# Patient Record
Sex: FEMALE | Race: WHITE | Employment: OTHER | URBAN - NONMETROPOLITAN AREA
[De-identification: names, ages, dates, MRNs, and addresses within clinical notes are randomized per-mention and may not be internally consistent; named-entity substitution may affect disease eponyms.]

---

## 2018-03-02 ENCOUNTER — HOSPITAL ENCOUNTER (INPATIENT)
Age: 83
LOS: 6 days | Discharge: NURSING, REHAB OR LONG-TERM CARE | DRG: 193 | End: 2018-03-11
Attending: EMERGENCY MEDICINE | Admitting: INTERNAL MEDICINE
Payer: MEDICARE

## 2018-03-02 ENCOUNTER — APPOINTMENT (OUTPATIENT)
Dept: GENERAL RADIOLOGY | Age: 83
DRG: 193 | End: 2018-03-02
Payer: MEDICARE

## 2018-03-02 DIAGNOSIS — N39.0 URINARY TRACT INFECTION WITHOUT HEMATURIA, SITE UNSPECIFIED: ICD-10-CM

## 2018-03-02 DIAGNOSIS — R41.82 ALTERED MENTAL STATUS, UNSPECIFIED ALTERED MENTAL STATUS TYPE: Primary | ICD-10-CM

## 2018-03-02 DIAGNOSIS — M06.062 RHEUMATOID ARTHRITIS INVOLVING LEFT KNEE WITH NEGATIVE RHEUMATOID FACTOR (HCC): ICD-10-CM

## 2018-03-02 DIAGNOSIS — R77.8 ELEVATED TROPONIN: ICD-10-CM

## 2018-03-02 PROBLEM — E86.0 DEHYDRATION: Status: ACTIVE | Noted: 2018-03-02

## 2018-03-02 PROBLEM — G93.40 ACUTE ENCEPHALOPATHY: Status: ACTIVE | Noted: 2018-03-02

## 2018-03-02 LAB
ALBUMIN SERPL-MCNC: 3.3 G/DL (ref 3.5–5.1)
ALP BLD-CCNC: 43 U/L (ref 38–126)
ALT SERPL-CCNC: 21 U/L (ref 11–66)
ANION GAP SERPL CALCULATED.3IONS-SCNC: 10 MEQ/L (ref 8–16)
ANISOCYTOSIS: ABNORMAL
AST SERPL-CCNC: 22 U/L (ref 5–40)
AVERAGE GLUCOSE: 120 MG/DL (ref 70–126)
BACTERIA: ABNORMAL /HPF
BASOPHILS # BLD: 0.3 %
BASOPHILS ABSOLUTE: 0 THOU/MM3 (ref 0–0.1)
BILIRUB SERPL-MCNC: 0.5 MG/DL (ref 0.3–1.2)
BILIRUBIN URINE: NEGATIVE
BLOOD, URINE: NEGATIVE
BUN BLDV-MCNC: 21 MG/DL (ref 7–22)
CALCIUM SERPL-MCNC: 10.2 MG/DL (ref 8.5–10.5)
CASTS 2: ABNORMAL /LPF
CASTS UA: ABNORMAL /LPF
CHARACTER, URINE: ABNORMAL
CHLORIDE BLD-SCNC: 98 MEQ/L (ref 98–111)
CO2: 36 MEQ/L (ref 23–33)
COLOR: YELLOW
CREAT SERPL-MCNC: 0.7 MG/DL (ref 0.4–1.2)
CRYSTALS, UA: ABNORMAL
EKG ATRIAL RATE: 67 BPM
EKG P AXIS: 42 DEGREES
EKG P-R INTERVAL: 110 MS
EKG Q-T INTERVAL: 406 MS
EKG QRS DURATION: 90 MS
EKG QTC CALCULATION (BAZETT): 429 MS
EKG R AXIS: -27 DEGREES
EKG T AXIS: 105 DEGREES
EKG VENTRICULAR RATE: 67 BPM
EOSINOPHIL # BLD: 1.7 %
EOSINOPHILS ABSOLUTE: 0.2 THOU/MM3 (ref 0–0.4)
EPITHELIAL CELLS, UA: ABNORMAL /HPF
GFR SERPL CREATININE-BSD FRML MDRD: 80 ML/MIN/1.73M2
GLUCOSE BLD-MCNC: 112 MG/DL (ref 70–108)
GLUCOSE BLD-MCNC: 137 MG/DL (ref 70–108)
GLUCOSE URINE: NEGATIVE MG/DL
HBA1C MFR BLD: 6 % (ref 4.4–6.4)
HCT VFR BLD CALC: 34.7 % (ref 37–47)
HEMOGLOBIN: 10.8 GM/DL (ref 12–16)
INR BLD: 0.96 (ref 0.85–1.13)
KETONES, URINE: NEGATIVE
LACTIC ACID: 1.8 MMOL/L (ref 0.5–2.2)
LEUKOCYTE ESTERASE, URINE: ABNORMAL
LYMPHOCYTES # BLD: 10.2 %
LYMPHOCYTES ABSOLUTE: 0.9 THOU/MM3 (ref 1–4.8)
MCH RBC QN AUTO: 29 PG (ref 27–31)
MCHC RBC AUTO-ENTMCNC: 31 GM/DL (ref 33–37)
MCV RBC AUTO: 93.4 FL (ref 81–99)
MISCELLANEOUS 2: ABNORMAL
MISCELLANEOUS LAB TEST RESULT: ABNORMAL
MONOCYTES # BLD: 3.6 %
MONOCYTES ABSOLUTE: 0.3 THOU/MM3 (ref 0.4–1.3)
NITRITE, URINE: NEGATIVE
NUCLEATED RED BLOOD CELLS: 0 /100 WBC
OSMOLALITY CALCULATION: 290.6 MOSMOL/KG (ref 275–300)
PDW BLD-RTO: 23.1 % (ref 11.5–14.5)
PH UA: 6.5
PLATELET # BLD: 256 THOU/MM3 (ref 130–400)
PMV BLD AUTO: 8 FL (ref 7.4–10.4)
POTASSIUM REFLEX MAGNESIUM: 3.7 MEQ/L (ref 3.5–5.2)
PRO-BNP: 1766 PG/ML (ref 0–1800)
PROTEIN UA: NEGATIVE
RBC # BLD: 3.71 MILL/MM3 (ref 4.2–5.4)
RBC URINE: ABNORMAL /HPF
RENAL EPITHELIAL, UA: ABNORMAL
SEG NEUTROPHILS: 84.2 %
SEGMENTED NEUTROPHILS ABSOLUTE COUNT: 7.7 THOU/MM3 (ref 1.8–7.7)
SODIUM BLD-SCNC: 144 MEQ/L (ref 135–145)
SPECIFIC GRAVITY, URINE: 1.03 (ref 1–1.03)
TOTAL PROTEIN: 5.9 G/DL (ref 6.1–8)
TROPONIN T: 0.05 NG/ML
UROBILINOGEN, URINE: 1 EU/DL
WBC # BLD: 9.2 THOU/MM3 (ref 4.8–10.8)
WBC UA: ABNORMAL /HPF
YEAST: ABNORMAL

## 2018-03-02 PROCEDURE — 36415 COLL VENOUS BLD VENIPUNCTURE: CPT

## 2018-03-02 PROCEDURE — 99223 1ST HOSP IP/OBS HIGH 75: CPT | Performed by: INTERNAL MEDICINE

## 2018-03-02 PROCEDURE — 83880 ASSAY OF NATRIURETIC PEPTIDE: CPT

## 2018-03-02 PROCEDURE — 6360000002 HC RX W HCPCS: Performed by: INTERNAL MEDICINE

## 2018-03-02 PROCEDURE — 87106 FUNGI IDENTIFICATION YEAST: CPT

## 2018-03-02 PROCEDURE — 80053 COMPREHEN METABOLIC PANEL: CPT

## 2018-03-02 PROCEDURE — 2580000003 HC RX 258: Performed by: INTERNAL MEDICINE

## 2018-03-02 PROCEDURE — 83605 ASSAY OF LACTIC ACID: CPT

## 2018-03-02 PROCEDURE — G0378 HOSPITAL OBSERVATION PER HR: HCPCS

## 2018-03-02 PROCEDURE — 83036 HEMOGLOBIN GLYCOSYLATED A1C: CPT

## 2018-03-02 PROCEDURE — 71045 X-RAY EXAM CHEST 1 VIEW: CPT

## 2018-03-02 PROCEDURE — 6360000002 HC RX W HCPCS: Performed by: EMERGENCY MEDICINE

## 2018-03-02 PROCEDURE — 84484 ASSAY OF TROPONIN QUANT: CPT

## 2018-03-02 PROCEDURE — 82948 REAGENT STRIP/BLOOD GLUCOSE: CPT

## 2018-03-02 PROCEDURE — 99285 EMERGENCY DEPT VISIT HI MDM: CPT

## 2018-03-02 PROCEDURE — 96365 THER/PROPH/DIAG IV INF INIT: CPT

## 2018-03-02 PROCEDURE — 93010 ELECTROCARDIOGRAM REPORT: CPT | Performed by: NUCLEAR MEDICINE

## 2018-03-02 PROCEDURE — 93005 ELECTROCARDIOGRAM TRACING: CPT | Performed by: EMERGENCY MEDICINE

## 2018-03-02 PROCEDURE — 87086 URINE CULTURE/COLONY COUNT: CPT

## 2018-03-02 PROCEDURE — 81001 URINALYSIS AUTO W/SCOPE: CPT

## 2018-03-02 PROCEDURE — 87040 BLOOD CULTURE FOR BACTERIA: CPT

## 2018-03-02 PROCEDURE — 85610 PROTHROMBIN TIME: CPT

## 2018-03-02 PROCEDURE — 6370000000 HC RX 637 (ALT 250 FOR IP): Performed by: INTERNAL MEDICINE

## 2018-03-02 PROCEDURE — 85025 COMPLETE CBC W/AUTO DIFF WBC: CPT

## 2018-03-02 RX ORDER — POTASSIUM CHLORIDE 20MEQ/15ML
40 LIQUID (ML) ORAL PRN
Status: DISCONTINUED | OUTPATIENT
Start: 2018-03-02 | End: 2018-03-11 | Stop reason: HOSPADM

## 2018-03-02 RX ORDER — DEXTROSE MONOHYDRATE 25 G/50ML
12.5 INJECTION, SOLUTION INTRAVENOUS PRN
Status: DISCONTINUED | OUTPATIENT
Start: 2018-03-02 | End: 2018-03-11 | Stop reason: HOSPADM

## 2018-03-02 RX ORDER — LANOLIN ALCOHOL/MO/W.PET/CERES
50 CREAM (GRAM) TOPICAL DAILY
Status: DISCONTINUED | OUTPATIENT
Start: 2018-03-02 | End: 2018-03-11 | Stop reason: HOSPADM

## 2018-03-02 RX ORDER — ONDANSETRON 2 MG/ML
4 INJECTION INTRAMUSCULAR; INTRAVENOUS EVERY 6 HOURS PRN
Status: DISCONTINUED | OUTPATIENT
Start: 2018-03-02 | End: 2018-03-11 | Stop reason: HOSPADM

## 2018-03-02 RX ORDER — GABAPENTIN 100 MG/1
100 CAPSULE ORAL 2 TIMES DAILY
Status: DISCONTINUED | OUTPATIENT
Start: 2018-03-02 | End: 2018-03-11 | Stop reason: HOSPADM

## 2018-03-02 RX ORDER — DEXTROSE MONOHYDRATE 50 MG/ML
100 INJECTION, SOLUTION INTRAVENOUS PRN
Status: DISCONTINUED | OUTPATIENT
Start: 2018-03-02 | End: 2018-03-11 | Stop reason: HOSPADM

## 2018-03-02 RX ORDER — CLOPIDOGREL BISULFATE 75 MG/1
75 TABLET ORAL DAILY
Status: DISCONTINUED | OUTPATIENT
Start: 2018-03-03 | End: 2018-03-11 | Stop reason: HOSPADM

## 2018-03-02 RX ORDER — ACETAMINOPHEN 325 MG/1
650 TABLET ORAL EVERY 4 HOURS PRN
Status: DISCONTINUED | OUTPATIENT
Start: 2018-03-02 | End: 2018-03-11 | Stop reason: HOSPADM

## 2018-03-02 RX ORDER — ISOSORBIDE MONONITRATE 30 MG/1
30 TABLET, EXTENDED RELEASE ORAL DAILY
Status: DISCONTINUED | OUTPATIENT
Start: 2018-03-03 | End: 2018-03-11 | Stop reason: HOSPADM

## 2018-03-02 RX ORDER — ACETAMINOPHEN 325 MG/1
650 TABLET ORAL EVERY 4 HOURS PRN
Status: DISCONTINUED | OUTPATIENT
Start: 2018-03-02 | End: 2018-03-02 | Stop reason: SDUPTHER

## 2018-03-02 RX ORDER — GLIPIZIDE 5 MG/1
5 TABLET ORAL
Status: DISCONTINUED | OUTPATIENT
Start: 2018-03-03 | End: 2018-03-06

## 2018-03-02 RX ORDER — SODIUM CHLORIDE 0.9 % (FLUSH) 0.9 %
10 SYRINGE (ML) INJECTION EVERY 12 HOURS SCHEDULED
Status: DISCONTINUED | OUTPATIENT
Start: 2018-03-02 | End: 2018-03-11 | Stop reason: HOSPADM

## 2018-03-02 RX ORDER — ASPIRIN 81 MG/1
81 TABLET ORAL DAILY
Status: DISCONTINUED | OUTPATIENT
Start: 2018-03-03 | End: 2018-03-11 | Stop reason: HOSPADM

## 2018-03-02 RX ORDER — NICOTINE POLACRILEX 4 MG
15 LOZENGE BUCCAL PRN
Status: DISCONTINUED | OUTPATIENT
Start: 2018-03-02 | End: 2018-03-11 | Stop reason: HOSPADM

## 2018-03-02 RX ORDER — ATENOLOL 50 MG/1
50 TABLET ORAL DAILY
Status: DISCONTINUED | OUTPATIENT
Start: 2018-03-03 | End: 2018-03-11 | Stop reason: HOSPADM

## 2018-03-02 RX ORDER — POTASSIUM CHLORIDE 7.45 MG/ML
10 INJECTION INTRAVENOUS PRN
Status: DISCONTINUED | OUTPATIENT
Start: 2018-03-02 | End: 2018-03-11 | Stop reason: HOSPADM

## 2018-03-02 RX ORDER — LEVOFLOXACIN 5 MG/ML
500 INJECTION, SOLUTION INTRAVENOUS ONCE
Status: COMPLETED | OUTPATIENT
Start: 2018-03-02 | End: 2018-03-02

## 2018-03-02 RX ORDER — POTASSIUM CHLORIDE 750 MG/1
20 TABLET, FILM COATED, EXTENDED RELEASE ORAL 2 TIMES DAILY WITH MEALS
Status: DISCONTINUED | OUTPATIENT
Start: 2018-03-02 | End: 2018-03-07

## 2018-03-02 RX ORDER — BUSPIRONE HYDROCHLORIDE 10 MG/1
10 TABLET ORAL 2 TIMES DAILY
Status: DISCONTINUED | OUTPATIENT
Start: 2018-03-02 | End: 2018-03-07

## 2018-03-02 RX ORDER — TROSPIUM CHLORIDE 20 MG/1
20 TABLET, FILM COATED ORAL 2 TIMES DAILY
Status: DISCONTINUED | OUTPATIENT
Start: 2018-03-02 | End: 2018-03-06

## 2018-03-02 RX ORDER — AMLODIPINE BESYLATE 5 MG/1
5 TABLET ORAL DAILY
Status: DISCONTINUED | OUTPATIENT
Start: 2018-03-02 | End: 2018-03-11 | Stop reason: HOSPADM

## 2018-03-02 RX ORDER — POTASSIUM CHLORIDE 20 MEQ/1
40 TABLET, EXTENDED RELEASE ORAL PRN
Status: DISCONTINUED | OUTPATIENT
Start: 2018-03-02 | End: 2018-03-11 | Stop reason: HOSPADM

## 2018-03-02 RX ORDER — CLONIDINE HYDROCHLORIDE 0.1 MG/1
0.1 TABLET ORAL 2 TIMES DAILY
Status: DISCONTINUED | OUTPATIENT
Start: 2018-03-02 | End: 2018-03-06

## 2018-03-02 RX ORDER — IRON POLYSACCHARIDE COMPLEX 150 MG
150 CAPSULE ORAL DAILY
Status: DISCONTINUED | OUTPATIENT
Start: 2018-03-02 | End: 2018-03-11 | Stop reason: HOSPADM

## 2018-03-02 RX ORDER — SODIUM CHLORIDE 0.9 % (FLUSH) 0.9 %
10 SYRINGE (ML) INJECTION PRN
Status: DISCONTINUED | OUTPATIENT
Start: 2018-03-02 | End: 2018-03-02 | Stop reason: SDUPTHER

## 2018-03-02 RX ORDER — SODIUM CHLORIDE 0.9 % (FLUSH) 0.9 %
10 SYRINGE (ML) INJECTION PRN
Status: DISCONTINUED | OUTPATIENT
Start: 2018-03-02 | End: 2018-03-11 | Stop reason: HOSPADM

## 2018-03-02 RX ORDER — SODIUM CHLORIDE 9 MG/ML
INJECTION, SOLUTION INTRAVENOUS ONCE
Status: COMPLETED | OUTPATIENT
Start: 2018-03-02 | End: 2018-03-03

## 2018-03-02 RX ORDER — SODIUM CHLORIDE 0.9 % (FLUSH) 0.9 %
10 SYRINGE (ML) INJECTION EVERY 12 HOURS SCHEDULED
Status: DISCONTINUED | OUTPATIENT
Start: 2018-03-02 | End: 2018-03-02 | Stop reason: SDUPTHER

## 2018-03-02 RX ORDER — SIMVASTATIN 20 MG
20 TABLET ORAL NIGHTLY
Status: DISCONTINUED | OUTPATIENT
Start: 2018-03-02 | End: 2018-03-06

## 2018-03-02 RX ADMIN — GABAPENTIN 100 MG: 100 CAPSULE ORAL at 22:06

## 2018-03-02 RX ADMIN — PYRIDOXINE HCL TAB 50 MG 50 MG: 50 TAB at 22:06

## 2018-03-02 RX ADMIN — CLONIDINE HYDROCHLORIDE 0.1 MG: 0.1 TABLET ORAL at 22:06

## 2018-03-02 RX ADMIN — MAGNESIUM 64 MG (MAGNESIUM CHLORIDE) TABLET,DELAYED RELEASE 535 MG: at 23:39

## 2018-03-02 RX ADMIN — TROSPIUM CHLORIDE 20 MG: 20 TABLET ORAL at 22:06

## 2018-03-02 RX ADMIN — SIMVASTATIN 20 MG: 20 TABLET, FILM COATED ORAL at 22:06

## 2018-03-02 RX ADMIN — Medication 150 MG: at 23:39

## 2018-03-02 RX ADMIN — SODIUM CHLORIDE: 9 INJECTION, SOLUTION INTRAVENOUS at 22:05

## 2018-03-02 RX ADMIN — BUSPIRONE HYDROCHLORIDE 10 MG: 10 TABLET ORAL at 22:06

## 2018-03-02 RX ADMIN — LEVOFLOXACIN 500 MG: 5 INJECTION, SOLUTION INTRAVENOUS at 17:49

## 2018-03-02 RX ADMIN — AMLODIPINE BESYLATE 5 MG: 5 TABLET ORAL at 22:06

## 2018-03-02 RX ADMIN — Medication 10 ML: at 22:15

## 2018-03-02 RX ADMIN — POTASSIUM CHLORIDE 20 MEQ: 750 TABLET, FILM COATED, EXTENDED RELEASE ORAL at 22:05

## 2018-03-02 ASSESSMENT — PAIN DESCRIPTION - LOCATION
LOCATION: LEG

## 2018-03-02 ASSESSMENT — PAIN DESCRIPTION - DESCRIPTORS
DESCRIPTORS: ACHING

## 2018-03-02 ASSESSMENT — PAIN DESCRIPTION - FREQUENCY
FREQUENCY: CONTINUOUS

## 2018-03-02 ASSESSMENT — PAIN SCALES - GENERAL
PAINLEVEL_OUTOF10: 4
PAINLEVEL_OUTOF10: 0
PAINLEVEL_OUTOF10: 4
PAINLEVEL_OUTOF10: 4

## 2018-03-02 ASSESSMENT — PAIN DESCRIPTION - ONSET
ONSET: ON-GOING

## 2018-03-02 ASSESSMENT — PAIN DESCRIPTION - PAIN TYPE
TYPE: CHRONIC PAIN

## 2018-03-02 ASSESSMENT — PAIN DESCRIPTION - PROGRESSION
CLINICAL_PROGRESSION: NOT CHANGED

## 2018-03-02 NOTE — ED PROVIDER NOTES
PO) Take by mouth      Vitamin D (CHOLECALCIFEROL) 1000 UNITS CAPS capsule Take 1,000 Units by mouth daily      Multiple Vitamins-Minerals (PX SENIOR VITAMIN PO) Take by mouth      acetaminophen (TYLENOL) 500 MG tablet Take 500 mg by mouth daily      atenolol (TENORMIN) 50 MG tablet Take 50 mg by mouth daily      clopidogrel (PLAVIX) 75 MG tablet Take 75 mg by mouth daily      Polysaccharide Iron Complex (POLY-IRON 150 PO) Take by mouth daily      isosorbide mononitrate (IMDUR) 30 MG CR tablet Take 30 mg by mouth daily      busPIRone (BUSPAR) 10 MG tablet Take 10 mg by mouth 2 times daily      sitaGLIPtin (JANUVIA) 50 MG tablet Take 50 mg by mouth daily      simvastatin (ZOCOR) 20 MG tablet Take 20 mg by mouth nightly      ezetimibe (ZETIA) 10 MG tablet Take 10 mg by mouth daily      GABAPENTIN PO Take by mouth 3 times daily      metformin (GLUCOPHAGE) 500 MG tablet Take 1,000 mg by mouth 3 times daily. Stop 2 days before sx      furosemide (LASIX) 40 MG tablet   Take 20 mg by mouth daily       aspirin 81 MG tablet Take 81 mg by mouth daily. Stop 5 days before sx      methotrexate (RHEUMATREX) 2.5 MG chemo tablet Take 2.5 mg by mouth once a week. On fridays      predniSONE (DELTASONE) 10 MG tablet Take 10 mg by mouth three times a week. M-W-F      Pyridoxine HCl (VITAMIN B6 PO) Take by mouth      glipiZIDE (GLUCOTROL) 5 MG tablet Take 5 mg by mouth 2 times daily (before meals)      Magnesium Cl-Calcium Carbonate (SLOW-MAG PO) Take by mouth      amLODIPine (NORVASC) 5 MG tablet Take 5 mg by mouth daily      cloNIDine (CATAPRES) 0.1 MG tablet Take 0.1 mg by mouth 2 times daily      potassium chloride (KLOR-CON) 20 MEQ packet Take 20 mEq by mouth 2 times daily             ALLERGIES     is allergic to detrol [tolterodine tartrate]; doxycycline; fosamax [alendronate]; and lotensin [benazepril]. FAMILY HISTORY     has no family status information on file. family history is not on file.     SOCIAL HISTORY      reports

## 2018-03-03 LAB
ANION GAP SERPL CALCULATED.3IONS-SCNC: 8 MEQ/L (ref 8–16)
BUN BLDV-MCNC: 19 MG/DL (ref 7–22)
CALCIUM SERPL-MCNC: 8.8 MG/DL (ref 8.5–10.5)
CHLORIDE BLD-SCNC: 105 MEQ/L (ref 98–111)
CO2: 33 MEQ/L (ref 23–33)
CREAT SERPL-MCNC: 0.9 MG/DL (ref 0.4–1.2)
GFR SERPL CREATININE-BSD FRML MDRD: 60 ML/MIN/1.73M2
GLUCOSE BLD-MCNC: 65 MG/DL (ref 70–108)
GLUCOSE BLD-MCNC: 67 MG/DL (ref 70–108)
GLUCOSE BLD-MCNC: 83 MG/DL (ref 70–108)
GLUCOSE BLD-MCNC: 85 MG/DL (ref 70–108)
GLUCOSE BLD-MCNC: 86 MG/DL (ref 70–108)
GLUCOSE BLD-MCNC: 99 MG/DL (ref 70–108)
HCT VFR BLD CALC: 30.9 % (ref 37–47)
HEMOGLOBIN: 9.8 GM/DL (ref 12–16)
MCH RBC QN AUTO: 29.8 PG (ref 27–31)
MCHC RBC AUTO-ENTMCNC: 31.8 GM/DL (ref 33–37)
MCV RBC AUTO: 93.6 FL (ref 81–99)
OSMOLALITY CALCULATION: 291 MOSMOL/KG (ref 275–300)
PDW BLD-RTO: 21.3 % (ref 11.5–14.5)
PLATELET # BLD: 218 THOU/MM3 (ref 130–400)
PMV BLD AUTO: 7.8 FL (ref 7.4–10.4)
POTASSIUM REFLEX MAGNESIUM: 3.9 MEQ/L (ref 3.5–5.2)
RBC # BLD: 3.31 MILL/MM3 (ref 4.2–5.4)
SODIUM BLD-SCNC: 146 MEQ/L (ref 135–145)
TROPONIN T: 0.03 NG/ML
TROPONIN T: 0.03 NG/ML
WBC # BLD: 6.8 THOU/MM3 (ref 4.8–10.8)

## 2018-03-03 PROCEDURE — G0378 HOSPITAL OBSERVATION PER HR: HCPCS

## 2018-03-03 PROCEDURE — 36415 COLL VENOUS BLD VENIPUNCTURE: CPT

## 2018-03-03 PROCEDURE — 6370000000 HC RX 637 (ALT 250 FOR IP): Performed by: INTERNAL MEDICINE

## 2018-03-03 PROCEDURE — 80048 BASIC METABOLIC PNL TOTAL CA: CPT

## 2018-03-03 PROCEDURE — 87086 URINE CULTURE/COLONY COUNT: CPT

## 2018-03-03 PROCEDURE — 99232 SBSQ HOSP IP/OBS MODERATE 35: CPT | Performed by: INTERNAL MEDICINE

## 2018-03-03 PROCEDURE — 85027 COMPLETE CBC AUTOMATED: CPT

## 2018-03-03 PROCEDURE — 96372 THER/PROPH/DIAG INJ SC/IM: CPT

## 2018-03-03 PROCEDURE — 51702 INSERT TEMP BLADDER CATH: CPT

## 2018-03-03 PROCEDURE — 6360000002 HC RX W HCPCS: Performed by: INTERNAL MEDICINE

## 2018-03-03 PROCEDURE — 82948 REAGENT STRIP/BLOOD GLUCOSE: CPT

## 2018-03-03 PROCEDURE — 2580000003 HC RX 258: Performed by: INTERNAL MEDICINE

## 2018-03-03 PROCEDURE — 84484 ASSAY OF TROPONIN QUANT: CPT

## 2018-03-03 PROCEDURE — 96367 TX/PROPH/DG ADDL SEQ IV INF: CPT

## 2018-03-03 RX ORDER — SODIUM CHLORIDE 9 MG/ML
INJECTION, SOLUTION INTRAVENOUS ONCE
Status: DISCONTINUED | OUTPATIENT
Start: 2018-03-03 | End: 2018-03-07

## 2018-03-03 RX ADMIN — GLIPIZIDE 5 MG: 5 TABLET ORAL at 03:33

## 2018-03-03 RX ADMIN — TROSPIUM CHLORIDE 20 MG: 20 TABLET ORAL at 09:29

## 2018-03-03 RX ADMIN — Medication 10 ML: at 19:48

## 2018-03-03 RX ADMIN — SIMVASTATIN 20 MG: 20 TABLET, FILM COATED ORAL at 20:07

## 2018-03-03 RX ADMIN — Medication 10 ML: at 09:39

## 2018-03-03 RX ADMIN — CLONIDINE HYDROCHLORIDE 0.1 MG: 0.1 TABLET ORAL at 20:08

## 2018-03-03 RX ADMIN — TROSPIUM CHLORIDE 20 MG: 20 TABLET ORAL at 20:09

## 2018-03-03 RX ADMIN — ASPIRIN 81 MG: 81 TABLET ORAL at 09:29

## 2018-03-03 RX ADMIN — Medication 150 MG: at 09:31

## 2018-03-03 RX ADMIN — CLONIDINE HYDROCHLORIDE 0.1 MG: 0.1 TABLET ORAL at 09:30

## 2018-03-03 RX ADMIN — VITAMIN D, TAB 1000IU (100/BT) 1000 UNITS: 25 TAB at 09:30

## 2018-03-03 RX ADMIN — CLOPIDOGREL BISULFATE 75 MG: 75 TABLET, FILM COATED ORAL at 09:30

## 2018-03-03 RX ADMIN — POTASSIUM CHLORIDE 20 MEQ: 750 TABLET, FILM COATED, EXTENDED RELEASE ORAL at 09:29

## 2018-03-03 RX ADMIN — LINAGLIPTIN 5 MG: 5 TABLET, FILM COATED ORAL at 09:30

## 2018-03-03 RX ADMIN — GABAPENTIN 100 MG: 100 CAPSULE ORAL at 20:08

## 2018-03-03 RX ADMIN — ATENOLOL 50 MG: 50 TABLET ORAL at 09:30

## 2018-03-03 RX ADMIN — MAGNESIUM 64 MG (MAGNESIUM CHLORIDE) TABLET,DELAYED RELEASE 535 MG: at 09:33

## 2018-03-03 RX ADMIN — AMLODIPINE BESYLATE 5 MG: 5 TABLET ORAL at 09:30

## 2018-03-03 RX ADMIN — ENOXAPARIN SODIUM 40 MG: 40 INJECTION SUBCUTANEOUS at 09:34

## 2018-03-03 RX ADMIN — GABAPENTIN 100 MG: 100 CAPSULE ORAL at 09:30

## 2018-03-03 RX ADMIN — BUSPIRONE HYDROCHLORIDE 10 MG: 10 TABLET ORAL at 09:29

## 2018-03-03 RX ADMIN — ISOSORBIDE MONONITRATE 30 MG: 30 TABLET ORAL at 09:30

## 2018-03-03 RX ADMIN — BUSPIRONE HYDROCHLORIDE 10 MG: 10 TABLET ORAL at 20:08

## 2018-03-03 ASSESSMENT — PAIN SCALES - GENERAL: PAINLEVEL_OUTOF10: 0

## 2018-03-03 NOTE — H&P
History & Physical        Patient:  Dom Kan  YOB: 1935    MRN: [id number]     Acct: [de-identified]    PCP: Andrews Lau MD    Date of Admission: 3/2/2018    Date of Service: Pt seen/examined on 3/2/18and Admitted to Inpatient with expected LOS greater than two midnights due to medical therapy. Chief Complaint:  Confusion    History Of Present Illness:      [de-identified] y.o. female who presented to [address] [place] [address] with  Confusion. Patient presents at ED with complaint of mental status. Per [facility] patient was treated at [facility] 2 times for pneumonia and UTI. Patient was discharged back to [facility] today. Per [facility] patient was confused still and they were concerned with Urosepsis. Per Er,upon arrival patient is alert and oriented x4. States only complaint is leg pain that is chronic. No n/v/d or fever or sob,orthopnea or PND was reported. Past Medical History:          Diagnosis Date    Anemia     Arthritis     CAD (coronary artery disease)     Cataract     Cellulitis     [facility]erebral artery occlusion with [facility] infarction [facility] [address]     Diabetes mellitus ([facility] [address]     Hyperlipidemia     Hypertension     MI (mitral incompetence)     Staph infection     Vitamin D deficiency        Past Surgical History:          Procedure Laterality Date    [facility]    FOOT SURGERY  1/2003    x2    JOINT REPLACEMENT      left shoulder    OTHER SURGICAL HISTORY  3/13/2013    LEFT REVERSE SHOULDER ARTHROPLASTY       Medications Prior to Admission:      Prior to Admission medications    Medication Sig Start Date End Date Taking?  Authorizing Provider   trospium (SANCTURA) 20 MG tablet Take 1 tablet by mouth 2 times daily 4/6/16  Yes Sarah Garcia MD   Calcium Citrate-Vitamin D (CALCIUM CITRATE + D3 PO) Take by mouth   Yes Historical Provider, MD   Vitamin D (CHOLECALCIFEROL) 1000 UNITS CAPS capsule Take 1,000 Units by mouth daily   Yes Historical Provider, MD

## 2018-03-03 NOTE — ED NOTES
Patient resting in bed. Son at bedside. Awaiting transport upstairs. No needs voiced at this time. Updated on plan of care. Respirations easy. Alert and oriented. Vitals as noted. Side rails up x2. Call light in reach.       Justin Maloney RN  03/02/18 [id number]

## 2018-03-03 NOTE — FLOWSHEET NOTE
03/03/18 0038   Provider Notification   Reason for Communication Evaluate  (unable to get IV access)   Provider Name Alice Chris   Provider Notification Nurse Practitioner   Method of Communication Secure Message   Response Waiting for response   Notification Time 0038  (responded at [phone])   Updated that unable to obtain IV access after multiple attempts. 0.9 @100 and Rocephin ordered. Fine crackles to bases. UA negative for bacteria. Order to dc both for now.

## 2018-03-03 NOTE — PLAN OF CARE
Problem: Falls - Risk of  Intervention: Fall risk assessment  Patient very unsteady on feet. Being assisted to bathroom using jeff stedy. Intervention: Fall precautions  Hourly rounding and bed alarm in place. Call light reinforced. Goal: Absence of falls  Outcome: Ongoing  No falls this shift, bed alarm on, and falling star precautions in place. Problem: Risk for Impaired Skin Integrity  Goal: Tissue integrity - skin and mucous membranes  Structural intactness and normal physiological function of skin and  mucous membranes. Outcome: Ongoing  Buttocks and BLE's red/purple discoloration, blanchable. Heels red, soft, blanchable. Scattered rash. Scattered bruising. Skin tear to right elbow. Being turned q2h and prn. Intervention: SKIN ASSESSMENT  Buttocks and BLE's red/purple discoloration, blanchable. Heels red, soft, blanchable. Scattered rash. Scattered bruising. Skin tear to right elbow. Intervention: TURN PATIENT  Patient being turned q2h and prn. Problem: Urinary Elimination:  Goal: Signs and symptoms of infection will decrease  Signs and symptoms of infection will decrease  Outcome: Ongoing  WBC within limits. Afebrile. UA negative for bacteria, moderate leukocytes. Rocephin dc'd, unable to obtain IV access. Problem: Pain:  Goal: Pain level will decrease  Pain level will decrease  Outcome: Met This Shift  Denies presence of pain throughout night. Will continue to monitor. Goal: Control of acute pain  Control of acute pain  Outcome: Met This Shift  Denies presence of pain throughout night. Will continue to monitor. Problem: Falls - Risk of:  Goal: Will remain free from falls  Will remain free from falls  Outcome: Ongoing      Problem: Discharge Planning:  Goal: Discharged to appropriate level of care  Discharged to appropriate level of care  Outcome: Ongoing  Patient hoping for [facility] to [facility] upon discharge.     Problem: Mental Status - Impaired:  Goal: Mental status will improve  Mental status will improve  Outcome: Ongoing  Oriented to person, place. Unsure of baseline. No family present at bedside. Comments: Care plan reviewed with patient. Patient verbalizes understanding of the plan of care and contribute to goal setting.

## 2018-03-04 LAB
GLUCOSE BLD-MCNC: 72 MG/DL (ref 70–108)
GLUCOSE BLD-MCNC: 81 MG/DL (ref 70–108)
GLUCOSE BLD-MCNC: 82 MG/DL (ref 70–108)
GLUCOSE BLD-MCNC: 93 MG/DL (ref 70–108)
TROPONIN T: 0.02 NG/ML

## 2018-03-04 PROCEDURE — 36415 COLL VENOUS BLD VENIPUNCTURE: CPT

## 2018-03-04 PROCEDURE — 82948 REAGENT STRIP/BLOOD GLUCOSE: CPT

## 2018-03-04 PROCEDURE — 84484 ASSAY OF TROPONIN QUANT: CPT

## 2018-03-04 PROCEDURE — 6360000002 HC RX W HCPCS: Performed by: INTERNAL MEDICINE

## 2018-03-04 PROCEDURE — 99232 SBSQ HOSP IP/OBS MODERATE 35: CPT | Performed by: INTERNAL MEDICINE

## 2018-03-04 PROCEDURE — 2580000003 HC RX 258: Performed by: INTERNAL MEDICINE

## 2018-03-04 PROCEDURE — 6370000000 HC RX 637 (ALT 250 FOR IP): Performed by: INTERNAL MEDICINE

## 2018-03-04 PROCEDURE — 96376 TX/PRO/DX INJ SAME DRUG ADON: CPT

## 2018-03-04 PROCEDURE — G0378 HOSPITAL OBSERVATION PER HR: HCPCS

## 2018-03-04 PROCEDURE — 96372 THER/PROPH/DIAG INJ SC/IM: CPT

## 2018-03-04 RX ORDER — FLUCONAZOLE, SODIUM CHLORIDE 2 MG/ML
100 INJECTION INTRAVENOUS EVERY 24 HOURS
Status: DISCONTINUED | OUTPATIENT
Start: 2018-03-04 | End: 2018-03-04

## 2018-03-04 RX ORDER — FLUCONAZOLE 200 MG/1
200 TABLET ORAL DAILY
Status: DISCONTINUED | OUTPATIENT
Start: 2018-03-04 | End: 2018-03-06

## 2018-03-04 RX ADMIN — GLIPIZIDE 5 MG: 5 TABLET ORAL at 05:57

## 2018-03-04 RX ADMIN — Medication 10 ML: at 08:12

## 2018-03-04 RX ADMIN — GABAPENTIN 100 MG: 100 CAPSULE ORAL at 08:07

## 2018-03-04 RX ADMIN — GABAPENTIN 100 MG: 100 CAPSULE ORAL at 20:45

## 2018-03-04 RX ADMIN — CEFTRIAXONE 1 G: 1 INJECTION, POWDER, FOR SOLUTION INTRAMUSCULAR; INTRAVENOUS at 08:13

## 2018-03-04 RX ADMIN — ASPIRIN 81 MG: 81 TABLET ORAL at 07:51

## 2018-03-04 RX ADMIN — BUSPIRONE HYDROCHLORIDE 10 MG: 10 TABLET ORAL at 20:46

## 2018-03-04 RX ADMIN — Medication 10 ML: at 20:48

## 2018-03-04 RX ADMIN — Medication 150 MG: at 07:58

## 2018-03-04 RX ADMIN — SIMVASTATIN 20 MG: 20 TABLET, FILM COATED ORAL at 20:48

## 2018-03-04 RX ADMIN — AMLODIPINE BESYLATE 5 MG: 5 TABLET ORAL at 08:09

## 2018-03-04 RX ADMIN — ENOXAPARIN SODIUM 40 MG: 40 INJECTION SUBCUTANEOUS at 08:13

## 2018-03-04 RX ADMIN — FLUCONAZOLE 200 MG: 200 TABLET ORAL at 13:07

## 2018-03-04 RX ADMIN — POTASSIUM CHLORIDE 20 MEQ: 750 TABLET, FILM COATED, EXTENDED RELEASE ORAL at 08:09

## 2018-03-04 RX ADMIN — CLOPIDOGREL BISULFATE 75 MG: 75 TABLET, FILM COATED ORAL at 07:55

## 2018-03-04 RX ADMIN — GLIPIZIDE 5 MG: 5 TABLET ORAL at 16:46

## 2018-03-04 RX ADMIN — POTASSIUM CHLORIDE 20 MEQ: 750 TABLET, FILM COATED, EXTENDED RELEASE ORAL at 16:46

## 2018-03-04 RX ADMIN — MAGNESIUM 64 MG (MAGNESIUM CHLORIDE) TABLET,DELAYED RELEASE 535 MG: at 07:58

## 2018-03-04 RX ADMIN — VITAMIN D, TAB 1000IU (100/BT) 1000 UNITS: 25 TAB at 07:52

## 2018-03-04 RX ADMIN — CLONIDINE HYDROCHLORIDE 0.1 MG: 0.1 TABLET ORAL at 20:47

## 2018-03-04 RX ADMIN — BUSPIRONE HYDROCHLORIDE 10 MG: 10 TABLET ORAL at 07:52

## 2018-03-04 RX ADMIN — PYRIDOXINE HCL TAB 50 MG 50 MG: 50 TAB at 07:58

## 2018-03-04 RX ADMIN — LINAGLIPTIN 5 MG: 5 TABLET, FILM COATED ORAL at 07:57

## 2018-03-04 RX ADMIN — ISOSORBIDE MONONITRATE 30 MG: 30 TABLET ORAL at 07:53

## 2018-03-04 RX ADMIN — CLONIDINE HYDROCHLORIDE 0.1 MG: 0.1 TABLET ORAL at 08:09

## 2018-03-04 RX ADMIN — TROSPIUM CHLORIDE 20 MG: 20 TABLET ORAL at 20:51

## 2018-03-04 RX ADMIN — ATENOLOL 50 MG: 50 TABLET ORAL at 07:58

## 2018-03-04 RX ADMIN — ACETAMINOPHEN 650 MG: 325 TABLET ORAL at 06:01

## 2018-03-04 RX ADMIN — TROSPIUM CHLORIDE 20 MG: 20 TABLET ORAL at 08:06

## 2018-03-04 ASSESSMENT — PAIN DESCRIPTION - LOCATION: LOCATION: HEAD

## 2018-03-04 ASSESSMENT — PAIN SCALES - GENERAL
PAINLEVEL_OUTOF10: 0
PAINLEVEL_OUTOF10: 4

## 2018-03-04 ASSESSMENT — PAIN DESCRIPTION - PAIN TYPE: TYPE: ACUTE PAIN

## 2018-03-04 NOTE — PLAN OF CARE
to have confusion and inappropriate answers such as the year and location are not consistently correct when asked. Patient seems to be more oriented today than yesterday but is not able to actively recall current year. No therapy ordered at this time. Comments: Care plan reviewed with patient and family. Patient and family verbalize understanding of the plan of care and contribute to goal setting.

## 2018-03-05 PROBLEM — G93.40 ENCEPHALOPATHY: Status: ACTIVE | Noted: 2018-03-05

## 2018-03-05 LAB
GLUCOSE BLD-MCNC: 115 MG/DL (ref 70–108)
GLUCOSE BLD-MCNC: 138 MG/DL (ref 70–108)
GLUCOSE BLD-MCNC: 78 MG/DL (ref 70–108)
GLUCOSE BLD-MCNC: 83 MG/DL (ref 70–108)
ORGANISM: ABNORMAL
URINE CULTURE REFLEX: ABNORMAL
URINE CULTURE, ROUTINE: NORMAL

## 2018-03-05 PROCEDURE — 1200000003 HC TELEMETRY R&B

## 2018-03-05 PROCEDURE — 6370000000 HC RX 637 (ALT 250 FOR IP): Performed by: INTERNAL MEDICINE

## 2018-03-05 PROCEDURE — 2580000003 HC RX 258: Performed by: INTERNAL MEDICINE

## 2018-03-05 PROCEDURE — 99233 SBSQ HOSP IP/OBS HIGH 50: CPT | Performed by: INTERNAL MEDICINE

## 2018-03-05 PROCEDURE — 6360000002 HC RX W HCPCS: Performed by: INTERNAL MEDICINE

## 2018-03-05 PROCEDURE — 96372 THER/PROPH/DIAG INJ SC/IM: CPT

## 2018-03-05 PROCEDURE — 82948 REAGENT STRIP/BLOOD GLUCOSE: CPT

## 2018-03-05 RX ADMIN — Medication 150 MG: at 08:44

## 2018-03-05 RX ADMIN — CLOPIDOGREL BISULFATE 75 MG: 75 TABLET, FILM COATED ORAL at 08:34

## 2018-03-05 RX ADMIN — FLUCONAZOLE 200 MG: 200 TABLET ORAL at 08:42

## 2018-03-05 RX ADMIN — CLONIDINE HYDROCHLORIDE 0.1 MG: 0.1 TABLET ORAL at 23:40

## 2018-03-05 RX ADMIN — GABAPENTIN 100 MG: 100 CAPSULE ORAL at 23:40

## 2018-03-05 RX ADMIN — BUSPIRONE HYDROCHLORIDE 10 MG: 10 TABLET ORAL at 23:41

## 2018-03-05 RX ADMIN — POTASSIUM CHLORIDE 20 MEQ: 750 TABLET, FILM COATED, EXTENDED RELEASE ORAL at 08:42

## 2018-03-05 RX ADMIN — ASPIRIN 81 MG: 81 TABLET ORAL at 08:43

## 2018-03-05 RX ADMIN — AMLODIPINE BESYLATE 5 MG: 5 TABLET ORAL at 08:42

## 2018-03-05 RX ADMIN — Medication 10 ML: at 08:43

## 2018-03-05 RX ADMIN — ISOSORBIDE MONONITRATE 30 MG: 30 TABLET ORAL at 08:39

## 2018-03-05 RX ADMIN — GLIPIZIDE 5 MG: 5 TABLET ORAL at 06:15

## 2018-03-05 RX ADMIN — SIMVASTATIN 20 MG: 20 TABLET, FILM COATED ORAL at 23:40

## 2018-03-05 RX ADMIN — GABAPENTIN 100 MG: 100 CAPSULE ORAL at 08:36

## 2018-03-05 RX ADMIN — LINAGLIPTIN 5 MG: 5 TABLET, FILM COATED ORAL at 08:42

## 2018-03-05 RX ADMIN — ENOXAPARIN SODIUM 40 MG: 40 INJECTION SUBCUTANEOUS at 08:45

## 2018-03-05 RX ADMIN — POTASSIUM CHLORIDE 20 MEQ: 750 TABLET, FILM COATED, EXTENDED RELEASE ORAL at 16:37

## 2018-03-05 RX ADMIN — MAGNESIUM 64 MG (MAGNESIUM CHLORIDE) TABLET,DELAYED RELEASE 535 MG: at 08:35

## 2018-03-05 RX ADMIN — TROSPIUM CHLORIDE 20 MG: 20 TABLET ORAL at 23:40

## 2018-03-05 RX ADMIN — BUSPIRONE HYDROCHLORIDE 10 MG: 10 TABLET ORAL at 08:33

## 2018-03-05 RX ADMIN — ACETAMINOPHEN 650 MG: 325 TABLET ORAL at 09:48

## 2018-03-05 RX ADMIN — CLONIDINE HYDROCHLORIDE 0.1 MG: 0.1 TABLET ORAL at 08:42

## 2018-03-05 RX ADMIN — ATENOLOL 50 MG: 50 TABLET ORAL at 08:32

## 2018-03-05 RX ADMIN — TROSPIUM CHLORIDE 20 MG: 20 TABLET ORAL at 08:37

## 2018-03-05 RX ADMIN — GLIPIZIDE 5 MG: 5 TABLET ORAL at 16:37

## 2018-03-05 RX ADMIN — PYRIDOXINE HCL TAB 50 MG 50 MG: 50 TAB at 08:35

## 2018-03-05 RX ADMIN — Medication 10 ML: at 23:41

## 2018-03-05 RX ADMIN — VITAMIN D, TAB 1000IU (100/BT) 1000 UNITS: 25 TAB at 08:37

## 2018-03-05 ASSESSMENT — PAIN SCALES - GENERAL
PAINLEVEL_OUTOF10: 0
PAINLEVEL_OUTOF10: 3
PAINLEVEL_OUTOF10: 5
PAINLEVEL_OUTOF10: 0

## 2018-03-05 NOTE — PLAN OF CARE
verbalize understanding of the plan of care and does not currently contribute to goal setting, no family present.

## 2018-03-05 NOTE — PLAN OF CARE
Problem: DISCHARGE BARRIERS  Goal: Patient's continuum of care needs are met  Outcome: Ongoing  Return to [facility] with [place] vs ECF for rehab. See SW progress notes.

## 2018-03-05 NOTE — PROGRESS NOTES
Infusions:   sodium chloride Stopped (03/03/18 1948)    dextrose       PRN Meds:.sodium chloride flush, acetaminophen, magnesium hydroxide, ondansetron, magnesium sulfate, potassium chloride **OR** potassium chloride **OR** potassium chloride, glucose, dextrose, glucagon (rDNA), dextrose  REVIEW OF SYSTEMS:   Pertinent positives as noted in the HPI. All other systems reviewed and negative. PHYSICAL EXAM:    BP (!) 114/54   Pulse 64   Temp 98.4 °F (36.9 °C) (Oral)   Resp 16   Ht 5' 3\" (1.6 m)   Wt 192 lb 6.4 oz (87.3 kg)   SpO2 95%   BMI 34.08 kg/m²     General appearance:  No apparent distress, appears stated age and cooperative. HEENT:  Normal cephalic, atraumatic without obvious deformity. Pupils equal, round, and reactive to light. Extra ocular muscles intact. Conjunctivae/corneas clear. Neck: Supple, with full range of motion. No jugular venous distention. Trachea midline. Respiratory:  Normal respiratory effort. Clear to auscultation, bilaterally without Rales/Wheezes/Rhonchi. Cardiovascular:  Regular rate and rhythm with normal S1/S2 without murmurs, rubs or gallops. Abdomen: Soft, non-tender, non-distended with normal bowel sounds. Musculoskeletal:  No clubbing, cyanosis or edema bilaterally. Full range of motion without deformity. Skin: Skin color, texture, turgor normal.  No rashes or lesions. Neurologic:  Neurovascularly intact without any focal sensory/motor deficits. Cranial nerves: II-XII intact, grossly non-focal.  Psychiatric:  Slightly confused; Alert and disoriented, thought content inappropriate, abnormal insight  Capillary Refill: Brisk,< 3 seconds   Peripheral Pulses: +2 palpable, equal bilaterally       Labs:     Recent Labs      03/02/18   1546  03/03/18   0645   WBC  9.2  6.8   HGB  10.8*  9.8*   HCT  34.7*  30.9*   PLT  256  218     Recent Labs      03/02/18   1546  03/03/18   0645   NA  144  146*   K  3.7  3.9   CL  98  105   CO2  36*  33   BUN  21  19   CREATININE  0.7

## 2018-03-05 NOTE — CARE COORDINATION
3/5/18, 7:37 AM      Butch Elaine       Admitted from: ED 3/2/2018/ 25[address] [facility] day: 0   Location: 6-03/003-A Reason for admit: Altered mental status [R41.82] Status: Observation  Admit order signed?: yes  PMH:  has a past medical history of Anemia; Arthritis; CAD (coronary artery disease); Cataract; Cellulitis; Cerebral artery occlusion with cerebral infarction ([address] Diabetes mellitus ([facility] [address] Hyperlipidemia; Hypertension; MI (mitral incompetence); Staph infection; and Vitamin D deficiency. Procedure:   Pertinent abnormal Imaging:     Medications:  Scheduled Meds:   pneumococcal 13-valent conjugate  0.5 mL Intramuscular Once    fluconazole  200 mg Oral Daily    sodium chloride flush  10 mL Intravenous 2 times per day    enoxaparin  40 mg Subcutaneous Daily    amLODIPine  5 mg Oral Daily    aspirin  81 mg Oral Daily    atenolol  50 mg Oral Daily    busPIRone  10 mg Oral BID    cloNIDine  0.1 mg Oral BID    clopidogrel  75 mg Oral Daily    gabapentin  100 mg Oral BID    glipiZIDE  5 mg Oral BID AC    isosorbide mononitrate  30 mg Oral Daily    magnesium chloride  535 mg Oral Daily    iron polysaccharides  150 mg Oral Daily    potassium chloride  20 mEq Oral BID WC    vitamin B-6  50 mg Oral Daily    simvastatin  20 mg Oral Nightly    linagliptin  5 mg Oral Daily    trospium  20 mg Oral BID    vitamin D  1,000 Units Oral Daily    insulin lispro  0-12 Units Subcutaneous TID WC    insulin lispro  0-6 Units Subcutaneous Nightly     Continuous Infusions:   sodium chloride Stopped (03/03/18 1948)    dextrose        Pertinent Info/Orders/Treatment Plan:  Monitor vitals and telemetry, skin prevention measures, accurate I/O, arreola catheter, monitor electrolytes with replacement protocol, Serial troponins, accu checks as ordered with medium-dose ISS, monitor blood cultures, PT and OT eval  Diet: DIET GENERAL;   DVT Prophylaxis: SCD's ordered  Smoking status:  reports that she has never smoked.

## 2018-03-05 NOTE — PLAN OF CARE
Problem: Falls - Risk of  Goal: Absence of falls  Outcome: Ongoing  Falling star interventions in place and effective. Bed locked and in lowest position. Call light within reach. Problem: Risk for Impaired Skin Integrity  Goal: Tissue integrity - skin and mucous membranes  Structural intactness and normal physiological function of skin and  mucous membranes. Outcome: Ongoing  Continue to monitor skin for breakdown. No areas of skin impairment noted. Turned every 2 hours. Problem: Discharge Planning:  Goal: Discharged to appropriate level of care  Discharged to appropriate level of care   Outcome: Ongoing  To  Be discharged to nursing home when medically stable. Problem: Mental Status - Impaired:  Goal: Mental status will improve  Mental status will improve   Outcome: Ongoing  Alert to name only. Redirected as needed. Comments: Care plan reviewed with patient. Patient verbalize understanding of the plan of care and contribute to goal setting.

## 2018-03-06 ENCOUNTER — APPOINTMENT (OUTPATIENT)
Dept: GENERAL RADIOLOGY | Age: 83
DRG: 193 | End: 2018-03-06
Payer: MEDICARE

## 2018-03-06 ENCOUNTER — APPOINTMENT (OUTPATIENT)
Dept: CT IMAGING | Age: 83
DRG: 193 | End: 2018-03-06
Payer: MEDICARE

## 2018-03-06 PROBLEM — E11.649 HYPOGLYCEMIA ASSOCIATED WITH DIABETES (HCC): Status: ACTIVE | Noted: 2018-03-06

## 2018-03-06 PROBLEM — G93.41 METABOLIC ENCEPHALOPATHY: Status: ACTIVE | Noted: 2018-03-02

## 2018-03-06 LAB
ALBUMIN SERPL-MCNC: 2.4 G/DL (ref 3.5–5.1)
ALLEN TEST: POSITIVE
ALP BLD-CCNC: 42 U/L (ref 38–126)
ALT SERPL-CCNC: 12 U/L (ref 11–66)
AMMONIA: 17 UMOL/L (ref 11–60)
ANION GAP SERPL CALCULATED.3IONS-SCNC: 9 MEQ/L (ref 8–16)
ANISOCYTOSIS: ABNORMAL
AST SERPL-CCNC: 18 U/L (ref 5–40)
BASE EXCESS (CALCULATED): 2.5 MMOL/L (ref -2.5–2.5)
BASOPHILS # BLD: 0.6 %
BASOPHILS ABSOLUTE: 0 THOU/MM3 (ref 0–0.1)
BILIRUB SERPL-MCNC: 0.4 MG/DL (ref 0.3–1.2)
BUN BLDV-MCNC: 10 MG/DL (ref 7–22)
CALCIUM SERPL-MCNC: 8.4 MG/DL (ref 8.5–10.5)
CHLORIDE BLD-SCNC: 102 MEQ/L (ref 98–111)
CO2: 25 MEQ/L (ref 23–33)
COLLECTED BY:: ABNORMAL
CREAT SERPL-MCNC: 0.7 MG/DL (ref 0.4–1.2)
DEVICE: ABNORMAL
EOSINOPHIL # BLD: 1.8 %
EOSINOPHILS ABSOLUTE: 0.1 THOU/MM3 (ref 0–0.4)
FOLATE: > 20 NG/ML (ref 4.8–24.2)
GFR SERPL CREATININE-BSD FRML MDRD: 80 ML/MIN/1.73M2
GLUCOSE BLD-MCNC: 102 MG/DL (ref 70–108)
GLUCOSE BLD-MCNC: 116 MG/DL (ref 70–108)
GLUCOSE BLD-MCNC: 122 MG/DL (ref 70–108)
GLUCOSE BLD-MCNC: 129 MG/DL (ref 70–108)
GLUCOSE BLD-MCNC: 138 MG/DL (ref 70–108)
GLUCOSE BLD-MCNC: 204 MG/DL (ref 70–108)
GLUCOSE BLD-MCNC: 57 MG/DL (ref 70–108)
GLUCOSE BLD-MCNC: 60 MG/DL (ref 70–108)
GLUCOSE BLD-MCNC: 62 MG/DL (ref 70–108)
GLUCOSE BLD-MCNC: 80 MG/DL (ref 70–108)
GLUCOSE BLD-MCNC: 95 MG/DL (ref 70–108)
HCO3: 27 MMOL/L (ref 23–28)
HCT VFR BLD CALC: 33.7 % (ref 37–47)
HEMOGLOBIN: 10.4 GM/DL (ref 12–16)
LACTIC ACID: 1.1 MMOL/L (ref 0.5–2.2)
LYMPHOCYTES # BLD: 14.2 %
LYMPHOCYTES ABSOLUTE: 0.9 THOU/MM3 (ref 1–4.8)
MAGNESIUM: 1.8 MG/DL (ref 1.6–2.4)
MCH RBC QN AUTO: 29.1 PG (ref 27–31)
MCHC RBC AUTO-ENTMCNC: 30.9 GM/DL (ref 33–37)
MCV RBC AUTO: 94.2 FL (ref 81–99)
MONOCYTES # BLD: 9.5 %
MONOCYTES ABSOLUTE: 0.6 THOU/MM3 (ref 0.4–1.3)
NUCLEATED RED BLOOD CELLS: 0 /100 WBC
O2 SATURATION: 92 %
PCO2: 41 MMHG (ref 35–45)
PDW BLD-RTO: 23.3 % (ref 11.5–14.5)
PH BLOOD GAS: 7.43 (ref 7.35–7.45)
PHOSPHORUS: 2.8 MG/DL (ref 2.4–4.7)
PLATELET # BLD: 239 THOU/MM3 (ref 130–400)
PMV BLD AUTO: 8.1 FL (ref 7.4–10.4)
PO2: 62 MMHG (ref 71–104)
POTASSIUM SERPL-SCNC: 4.2 MEQ/L (ref 3.5–5.2)
PROCALCITONIN: 0.06 NG/ML (ref 0.01–0.09)
RBC # BLD: 3.58 MILL/MM3 (ref 4.2–5.4)
SEG NEUTROPHILS: 73.9 %
SEGMENTED NEUTROPHILS ABSOLUTE COUNT: 4.7 THOU/MM3 (ref 1.8–7.7)
SODIUM BLD-SCNC: 136 MEQ/L (ref 135–145)
SOURCE, BLOOD GAS: ABNORMAL
TOTAL PROTEIN: 4.6 G/DL (ref 6.1–8)
TSH SERPL DL<=0.05 MIU/L-ACNC: 2.75 UIU/ML (ref 0.4–4.2)
VITAMIN B-12: 992 PG/ML (ref 211–911)
WBC # BLD: 6.3 THOU/MM3 (ref 4.8–10.8)

## 2018-03-06 PROCEDURE — 83735 ASSAY OF MAGNESIUM: CPT

## 2018-03-06 PROCEDURE — 82948 REAGENT STRIP/BLOOD GLUCOSE: CPT

## 2018-03-06 PROCEDURE — 1200000003 HC TELEMETRY R&B

## 2018-03-06 PROCEDURE — 84443 ASSAY THYROID STIM HORMONE: CPT

## 2018-03-06 PROCEDURE — G8988 SELF CARE GOAL STATUS: HCPCS

## 2018-03-06 PROCEDURE — 97166 OT EVAL MOD COMPLEX 45 MIN: CPT

## 2018-03-06 PROCEDURE — 2500000003 HC RX 250 WO HCPCS: Performed by: INTERNAL MEDICINE

## 2018-03-06 PROCEDURE — 70450 CT HEAD/BRAIN W/O DYE: CPT

## 2018-03-06 PROCEDURE — 6370000000 HC RX 637 (ALT 250 FOR IP): Performed by: INTERNAL MEDICINE

## 2018-03-06 PROCEDURE — 85025 COMPLETE CBC W/AUTO DIFF WBC: CPT

## 2018-03-06 PROCEDURE — 36415 COLL VENOUS BLD VENIPUNCTURE: CPT

## 2018-03-06 PROCEDURE — 82746 ASSAY OF FOLIC ACID SERUM: CPT

## 2018-03-06 PROCEDURE — 83605 ASSAY OF LACTIC ACID: CPT

## 2018-03-06 PROCEDURE — 84145 PROCALCITONIN (PCT): CPT

## 2018-03-06 PROCEDURE — 84100 ASSAY OF PHOSPHORUS: CPT

## 2018-03-06 PROCEDURE — 71045 X-RAY EXAM CHEST 1 VIEW: CPT

## 2018-03-06 PROCEDURE — 2580000003 HC RX 258: Performed by: INTERNAL MEDICINE

## 2018-03-06 PROCEDURE — 80053 COMPREHEN METABOLIC PANEL: CPT

## 2018-03-06 PROCEDURE — 6360000002 HC RX W HCPCS: Performed by: INTERNAL MEDICINE

## 2018-03-06 PROCEDURE — 82140 ASSAY OF AMMONIA: CPT

## 2018-03-06 PROCEDURE — 99233 SBSQ HOSP IP/OBS HIGH 50: CPT | Performed by: INTERNAL MEDICINE

## 2018-03-06 PROCEDURE — 82607 VITAMIN B-12: CPT

## 2018-03-06 PROCEDURE — G8987 SELF CARE CURRENT STATUS: HCPCS

## 2018-03-06 PROCEDURE — 97162 PT EVAL MOD COMPLEX 30 MIN: CPT

## 2018-03-06 PROCEDURE — 36600 WITHDRAWAL OF ARTERIAL BLOOD: CPT

## 2018-03-06 PROCEDURE — 82803 BLOOD GASES ANY COMBINATION: CPT

## 2018-03-06 PROCEDURE — G8979 MOBILITY GOAL STATUS: HCPCS

## 2018-03-06 PROCEDURE — G8978 MOBILITY CURRENT STATUS: HCPCS

## 2018-03-06 PROCEDURE — 97110 THERAPEUTIC EXERCISES: CPT

## 2018-03-06 RX ORDER — ATORVASTATIN CALCIUM 20 MG/1
20 TABLET, FILM COATED ORAL NIGHTLY
Status: DISCONTINUED | OUTPATIENT
Start: 2018-03-06 | End: 2018-03-11 | Stop reason: HOSPADM

## 2018-03-06 RX ORDER — DEXTROSE, SODIUM CHLORIDE, AND POTASSIUM CHLORIDE 5; .45; .15 G/100ML; G/100ML; G/100ML
INJECTION INTRAVENOUS CONTINUOUS
Status: DISCONTINUED | OUTPATIENT
Start: 2018-03-06 | End: 2018-03-07

## 2018-03-06 RX ADMIN — ATENOLOL 50 MG: 50 TABLET ORAL at 08:11

## 2018-03-06 RX ADMIN — Medication 10 ML: at 08:12

## 2018-03-06 RX ADMIN — CLOPIDOGREL BISULFATE 75 MG: 75 TABLET, FILM COATED ORAL at 08:11

## 2018-03-06 RX ADMIN — AMLODIPINE BESYLATE 5 MG: 5 TABLET ORAL at 08:11

## 2018-03-06 RX ADMIN — ASPIRIN 81 MG: 81 TABLET ORAL at 08:11

## 2018-03-06 RX ADMIN — POTASSIUM CHLORIDE 20 MEQ: 750 TABLET, FILM COATED, EXTENDED RELEASE ORAL at 08:10

## 2018-03-06 RX ADMIN — FLUCONAZOLE 200 MG: 200 TABLET ORAL at 08:10

## 2018-03-06 RX ADMIN — ISOSORBIDE MONONITRATE 30 MG: 30 TABLET ORAL at 08:11

## 2018-03-06 RX ADMIN — POTASSIUM CHLORIDE, DEXTROSE MONOHYDRATE AND SODIUM CHLORIDE: 150; 5; 450 INJECTION, SOLUTION INTRAVENOUS at 09:31

## 2018-03-06 RX ADMIN — PYRIDOXINE HCL TAB 50 MG 50 MG: 50 TAB at 08:10

## 2018-03-06 RX ADMIN — DEXTROSE 15 G: 15 GEL ORAL at 07:57

## 2018-03-06 RX ADMIN — ATORVASTATIN CALCIUM 20 MG: 20 TABLET, FILM COATED ORAL at 20:44

## 2018-03-06 RX ADMIN — GABAPENTIN 100 MG: 100 CAPSULE ORAL at 08:11

## 2018-03-06 RX ADMIN — VITAMIN D, TAB 1000IU (100/BT) 1000 UNITS: 25 TAB at 08:10

## 2018-03-06 RX ADMIN — Medication 10 ML: at 20:43

## 2018-03-06 RX ADMIN — TROSPIUM CHLORIDE 20 MG: 20 TABLET ORAL at 08:10

## 2018-03-06 RX ADMIN — CLONIDINE HYDROCHLORIDE 0.1 MG: 0.1 TABLET ORAL at 08:11

## 2018-03-06 RX ADMIN — BUSPIRONE HYDROCHLORIDE 10 MG: 10 TABLET ORAL at 08:11

## 2018-03-06 RX ADMIN — MAGNESIUM 64 MG (MAGNESIUM CHLORIDE) TABLET,DELAYED RELEASE 535 MG: at 08:14

## 2018-03-06 RX ADMIN — POTASSIUM CHLORIDE 20 MEQ: 750 TABLET, FILM COATED, EXTENDED RELEASE ORAL at 18:22

## 2018-03-06 RX ADMIN — ENOXAPARIN SODIUM 40 MG: 40 INJECTION SUBCUTANEOUS at 08:20

## 2018-03-06 RX ADMIN — BUSPIRONE HYDROCHLORIDE 10 MG: 10 TABLET ORAL at 21:14

## 2018-03-06 RX ADMIN — Medication 150 MG: at 08:11

## 2018-03-06 RX ADMIN — DEXTROSE MONOHYDRATE 12.5 G: 500 INJECTION PARENTERAL at 09:18

## 2018-03-06 RX ADMIN — GABAPENTIN 100 MG: 100 CAPSULE ORAL at 21:14

## 2018-03-06 ASSESSMENT — PAIN DESCRIPTION - ORIENTATION: ORIENTATION: RIGHT;LEFT

## 2018-03-06 ASSESSMENT — PAIN SCALES - WONG BAKER
WONGBAKER_NUMERICALRESPONSE: 4
WONGBAKER_NUMERICALRESPONSE: 6

## 2018-03-06 ASSESSMENT — PAIN DESCRIPTION - PAIN TYPE
TYPE: ACUTE PAIN
TYPE: ACUTE PAIN

## 2018-03-06 ASSESSMENT — PAIN DESCRIPTION - LOCATION: LOCATION: LEG

## 2018-03-06 NOTE — PROGRESS NOTES
discharge. Diet: DIET GENERAL;    Code status: Full Code     ----------  Subjective  Reports bilateral leg pain, which appears to be chronic per chart review. Objective  Physical exam:  Vitals: BP (!) 90/56   Pulse 73   Temp 98.4 °F (36.9 °C) (Oral)   Resp 14   Ht 5' 3\" (1.6 m)   Wt 189 lb 8 oz (86 kg)   SpO2 94%   BMI 33.57 kg/m²   Gen: Not in distress. Alert. Confused. Head: Normocephalic. Atraumatic. Eyes: EOMI. Good acuity. ENT: Oral mucosa moist  Neck: No JVD. No obvious thyromegaly. CVS: Nml S1S2, no MRG, RRR  Pulmomary: Clear bilaterally. No crackles. No wheezes. Gastrointestinal: Soft, NT/ND. Positive bowel sounds. Musculoskeletal: No edema. Warm  Neuro: No focal deficit. Moves extremity spontaneously. Psychiatry: Appropriate affect. Not agitated. Skin: Warm, dry with normal turgor. No rash      24HR INTAKE/OUTPUT:    Intake/Output Summary (Last 24 hours) at 03/06/18 1527  Last data filed at 03/06/18 1246   Gross per 24 hour   Intake              360 ml   Output              925 ml   Net             -565 ml     I/O last 3 completed shifts: In: 360 [P.O.:360]  Out: 925 [Urine:925]  No intake/output data recorded.       Meds:    insulin lispro  0-6 Units Subcutaneous TID WC    insulin lispro  0-3 Units Subcutaneous Nightly    atorvastatin  20 mg Oral Nightly    pneumococcal 13-valent conjugate  0.5 mL Intramuscular Once    sodium chloride flush  10 mL Intravenous 2 times per day    enoxaparin  40 mg Subcutaneous Daily    amLODIPine  5 mg Oral Daily    aspirin  81 mg Oral Daily    atenolol  50 mg Oral Daily    busPIRone  10 mg Oral BID    clopidogrel  75 mg Oral Daily    gabapentin  100 mg Oral BID    isosorbide mononitrate  30 mg Oral Daily    magnesium chloride  535 mg Oral Daily    iron polysaccharides  150 mg Oral Daily    potassium chloride  20 mEq Oral BID WC    vitamin B-6  50 mg Oral Daily    vitamin D  1,000 Units Oral Daily       Infusions:    dextrose 5% and 0.45% NaCl with KCl 20 mEq 75 mL/hr at 03/06/18 0931    sodium chloride Stopped (03/03/18 1948)    dextrose           PRN Meds: sodium chloride flush, acetaminophen, magnesium hydroxide, ondansetron, magnesium sulfate, potassium chloride **OR** potassium chloride **OR** potassium chloride, glucose, dextrose, glucagon (rDNA), dextrose    Labs/imaging:  CBC:   Recent Labs      03/06/18   1243   WBC  6.3   HGB  10.4*   PLT  239         BMP:  Recent Labs      03/06/18   1243   NA  136   K  4.2   CL  102   CO2  25   BUN  10   CREATININE  0.7   GLUCOSE  116*         Hepatic: Recent Labs      03/06/18   1243   AST  18   ALT  12   BILITOT  0.4   ALKPHOS  42           Lidya Noland MD  -------------------------------  Rounding hospitalist

## 2018-03-06 NOTE — PROGRESS NOTES
more  Pain Type: Acute pain  Pain Location: Leg  Pain Orientation: Right;Left       Social/Functional:  Lives With: Alone  [facility]  Home Layout: One level  Home Access: Level entry  Home Equipment: Rolling walker     Bathroom Accessibility: Accessible    Receives Help From: Other (comment) (Facility)  ADL Assistance: Needs assistance (pt stated that they help, \"but not much\" with dressing, assist with bathing)  Homemaking Assistance: Needs assistance (pt states facility completes cleaning and provides meals)       Ambulation Assistance:  (pt stated was using RW, unable to state level of assist, if any)       Additional Comments: difficult to obtain accurate history, pt falling asleep throughout; RN aware    Objective         ADL  Grooming: Setup, Verbal cueing, Increased time to complete (pt handed wash cloth and was able to bring to face and complete washing face supine in bed with inc time allowed and cues)  LE Dressing: Dependent/Total (dependent for adjusting socks)  Toileting:  (arreola catheter)     Bed mobility  Rolling to Left: Maximum assistance  Rolling to Right: Maximum assistance  Supine to Sit: Maximum assistance (x1)  Sit to Supine: Moderate assistance (x2)  Scooting: Dependent/Total (hercules bed)       Balance  Sitting Balance: Moderate assistance (pt retropulsive and requiring assist to safely sit at EOB)  Standing Balance: Unable to assess(comment)         Functional Mobility  Functional Mobility Comments: unable to assess at this time, pt unsafe for transfers and mobility this date        Activity Tolerance:  Activity Tolerance: Patient limited by fatigue, Patient limited by pain  Activity Tolerance: Pt with increaed SOB this date and difficulty staying awake; RN notified along with MD and pt was returned to bed, unsafe to assess mobility and transfers this date. Question pt history as difficulty staying awake this date.   Pt required signiicant assist for bed mobiltiy    Assessment:  Assessment: Pt requires skilled OT intervention to address aboved deficits and for safe return to PLOF and transition to the next level of care  Performance deficits / Impairments: Decreased functional mobility , Decreased ADL status, Decreased strength, Decreased endurance, Decreased safe awareness, Decreased balance  Prognosis: Guarded  Discharge Recommendations: Continue to assess pending progress, Subacute/Skilled Nursing Facility, ECF with OT, Patient would benefit from continued therapy after discharge, Home with Home health OT    Clinical Decision Making: Clinical Decision making was of Moderate Complexity as the result of analysis of data from a detailed assessment, a consideration of several treatment options, the presence of comorbidities affecting the plan of care and the need for minimal to moderate modifications or assistance required to complete the evaluation. Patient Education:  Patient Education: OT Role, POC and goals, safety  Barriers to Learning: cognition    Equipment Recommendations: Other: Cont to assess    Safety:  Safety Devices in place: Yes  Type of devices: All fall risk precautions in place, Bed alarm in place, Call light within reach, Patient at risk for falls, Nurse notified, Left in bed (RN and MD present at end of session)    Plan:  Times per week: 3-5x  Current Treatment Recommendations: Strengthening, ROM, Balance Training, Functional Mobility Training, Endurance Training, Safety Education & Training, Self-Care / ADL    Goals:  Patient goals : none stated this date, pt with difficutly staying awake    Short term goals  Time Frame for Short term goals:  Two weeks  Short term goal 1: Pt to tolerate sitting EOB for >3 minutes with CGA for increased independence with ADL routine  Short term goal 2: OTR to assess functional mobility  Short term goal 3: Pt to complete BUE AROM exercises for increased functional strength and activity tolerance required for ADL

## 2018-03-06 NOTE — PROGRESS NOTES
[facility]  INPATIENT PHYSICAL THERAPY  EVALUATION  STR[facility] RENAL TELEMETRY 6K - 6K-03/003-A    Time In: 7080  Time Out: 1554  Timed Code Treatment Minutes: 8 Minutes  Minutes: 23          Date: 3/6/2018  Patient Name: Ghassan Cleaning,  Gender:  female        MRN: [id number]  : 1935  ([de-identified] y.o.)      Referring Practitioner: POOJA De Luna MD  Diagnosis: Altered Mental Status  Additional Pertinent Hx: Per ED note, pt is a [de-identified] y.o. female who presents to the ED for evaluation of altered mental status. The patient states that she was sent here by the nursing home for possible confusion and concern for a UTI. The patient denies a fever or urinary symptoms. She reports mild shortness of breath. The patient's daughter reports that the patient seems to be acting and talking normal. She states that the patient is usually ambulatory but the nursing home reported to her that she's been weak lately and has been using a wheelchair. Past Medical History:   Diagnosis Date    Anemia     Arthritis     CAD (coronary artery disease)     Cataract     Cellulitis     Cerebral artery occlusion with cerebral infarction ([address]     Diabetes mellitus type 2, controlled ([address]     Essential hypertension     Hyperlipidemia     MI (mitral incompetence)     Staph infection     Vitamin D deficiency      Past Surgical History:   Procedure Laterality Date    [facility]  09    [facility]    FOOT SURGERY  1/2003    x2    JOINT REPLACEMENT      left shoulder    OTHER SURGICAL HISTORY  3/13/2013    LEFT REVERSE SHOULDER ARTHROPLASTY       Restrictions/Precautions:  General Precautions, Fall Risk        Subjective:  Chart Reviewed: Yes  Patient assessed for rehabilitation services?: Yes  Subjective: Pt resting in bed and needed cues to awaken and pt agreed to try sitting up.     General:  Overall Orientation Status: Impaired  Orientation Level: Oriented to person, Disoriented to situation    Vision: Impaired  Vision Exceptions: Wears glasses at all times    Hearing: Within functional limits       Pain:  Denies (pt with mobility, pt grimaced and indicated pain ). Pain Assessment  Duncan-Garcia Pain Rating: Hurts little more  Pain Type: Acute pain       Social/Functional History:    Lives With: Alone  Type of Home: Facility ([facility])  Home Layout: One level  Home Access: Level entry  Home Equipment: Rolling walker     Bathroom Accessibility: Accessible    Receives Help From: Other (comment) (Facility)  ADL Assistance: Needs assistance (pt stated that they help, \"but not much\" with dressing, assist with bathing)  Homemaking Assistance: Needs assistance (pt states facility completes cleaning and provides meals)  Ambulation Assistance:  (pt stated was using RW, unable to state level of assist, if any)       Additional Comments: difficult to obtain accurate history, pt falling asleep throughout; RN aware; above info per OT note, pt seemed more confused with PT session and indicated living in a house with 3 steps and 1 rail. Suspect [address] is correct. Objective:     RLE AROM: WFL     LLE AROM : WFL       Strength RLE: Exception  Comment: grossly 3+/5    Strength LLE: Exception  Comment: grossly 3+/5        Supine to Sit: Moderate assistance (with verbal cues to reach for rail and assist for LEs and torso)  Sit to Supine: Moderate assistance (x 2 for safety)    Transfers  Sit to Stand: Unable to assess (Pt was too sleepy and started losing her sitting balance after sitting for about 3 min and requested to return to supine.)           Exercises:    Pt was guided through completion of LE exercises including: heelslides, hip abd/add, short arc quads, and ankle pumps. Each x 5-7 reps for strengthening to improve functional mobility. Activity Tolerance:  Activity Tolerance: Patient limited by fatigue;Patient limited by endurance; Patient limited by cognitive status    Treatment Initiated: See exercises above    Assessment: Body structures, Functions, Activity limitations: Decreased functional mobility , Decreased cognition, Decreased endurance, Decreased balance, Decreased strength, Decreased safe awareness  Assessment: Pt is [de-identified] y.o. female that is very sleepy and needed cues to stay awake for session. Pt is quite deconditioned and would benefit from further skilled PT to address decreased strength, decreased endurance, very limited mobility, and to assess transfers and ambulation. Prognosis: Fair    Clinical Presentation: Moderate - Evolving with Changing Characteristics: poor alertness level, decreased strength, decreased balance, limited cognition    Decision Making: Moderate Complexity based on patient assessment and decision making process of determining plan of care and establishing reasonable expectations for measurable functional outcomes    REQUIRES PT FOLLOW UP: Yes  Discharge Recommendations: Continue to assess pending progress, Patient would benefit from continued therapy after discharge    Patient Education:  Patient Education: plan of care and LE exercises  Barriers to Learning: cognitive    Equipment Recommendations:  Equipment Needed: No    Safety:  Type of devices:  All fall risk precautions in place, Patient at risk for falls, Left in bed, Bed alarm in place, Call light within reach, Gait belt, Nurse notified    Plan:  Times per week: 3-5x GM  Times per day: Daily  Specific instructions for Next Treatment: ther ex, ther act, sitting balance  Current Treatment Recommendations: Strengthening, Balance Training, Functional Mobility Training, Home Exercise Program, Safety Education & Training, Patient/Caregiver Education & Training    Goals:  Patient goals : go home  Short term goals  Time Frame for Short term goals: 5 days  Short term goal 1: Pt to be Min A for supine <> sit to get in/out of bed  Short term goal 2: Pt to sit EOB > 8 min with SBA for static and dynamic activity for

## 2018-03-07 ENCOUNTER — APPOINTMENT (OUTPATIENT)
Dept: MRI IMAGING | Age: 83
DRG: 193 | End: 2018-03-07
Payer: MEDICARE

## 2018-03-07 ENCOUNTER — APPOINTMENT (OUTPATIENT)
Dept: CT IMAGING | Age: 83
DRG: 193 | End: 2018-03-07
Payer: MEDICARE

## 2018-03-07 LAB
ALBUMIN SERPL-MCNC: 2.1 G/DL (ref 3.5–5.1)
ANION GAP SERPL CALCULATED.3IONS-SCNC: 11 MEQ/L (ref 8–16)
ANISOCYTOSIS: ABNORMAL
BASOPHILIA: SLIGHT
BASOPHILS # BLD: 0.3 %
BASOPHILS ABSOLUTE: 0 THOU/MM3 (ref 0–0.1)
BUN BLDV-MCNC: 8 MG/DL (ref 7–22)
CALCIUM SERPL-MCNC: 8.4 MG/DL (ref 8.5–10.5)
CHLORIDE BLD-SCNC: 104 MEQ/L (ref 98–111)
CHOLESTEROL, TOTAL: 98 MG/DL (ref 100–199)
CO2: 20 MEQ/L (ref 23–33)
CREAT SERPL-MCNC: 0.6 MG/DL (ref 0.4–1.2)
EOSINOPHIL # BLD: 1.4 %
EOSINOPHILS ABSOLUTE: 0.1 THOU/MM3 (ref 0–0.4)
FLU A ANTIGEN: NEGATIVE
FLU B ANTIGEN: NEGATIVE
GFR SERPL CREATININE-BSD FRML MDRD: > 90 ML/MIN/1.73M2
GLUCOSE BLD-MCNC: 104 MG/DL (ref 70–108)
GLUCOSE BLD-MCNC: 109 MG/DL (ref 70–108)
GLUCOSE BLD-MCNC: 110 MG/DL (ref 70–108)
GLUCOSE BLD-MCNC: 111 MG/DL (ref 70–108)
GLUCOSE BLD-MCNC: 99 MG/DL (ref 70–108)
HCT VFR BLD CALC: 33.8 % (ref 37–47)
HDLC SERPL-MCNC: 38 MG/DL
HEMOGLOBIN: 10.6 GM/DL (ref 12–16)
LDL CHOLESTEROL CALCULATED: 37 MG/DL
LYMPHOCYTES # BLD: 14.3 %
LYMPHOCYTES ABSOLUTE: 1.3 THOU/MM3 (ref 1–4.8)
MAGNESIUM: 1.8 MG/DL (ref 1.6–2.4)
MCH RBC QN AUTO: 29.5 PG (ref 27–31)
MCHC RBC AUTO-ENTMCNC: 31.3 GM/DL (ref 33–37)
MCV RBC AUTO: 94.2 FL (ref 81–99)
MONOCYTES # BLD: 11 %
MONOCYTES ABSOLUTE: 1 THOU/MM3 (ref 0.4–1.3)
NUCLEATED RED BLOOD CELLS: 0 /100 WBC
PDW BLD-RTO: 23.4 % (ref 11.5–14.5)
PHOSPHORUS: 2.8 MG/DL (ref 2.4–4.7)
PLATELET # BLD: 248 THOU/MM3 (ref 130–400)
PLATELET ESTIMATE: ADEQUATE
PMV BLD AUTO: 8 FL (ref 7.4–10.4)
POIKILOCYTES: SLIGHT
POTASSIUM SERPL-SCNC: 4.6 MEQ/L (ref 3.5–5.2)
POTASSIUM SERPL-SCNC: 6.3 MEQ/L (ref 3.5–5.2)
PROCALCITONIN: 0.32 NG/ML (ref 0.01–0.09)
RBC # BLD: 3.59 MILL/MM3 (ref 4.2–5.4)
SCAN OF BLOOD SMEAR: NORMAL
SEG NEUTROPHILS: 73 %
SEGMENTED NEUTROPHILS ABSOLUTE COUNT: 6.6 THOU/MM3 (ref 1.8–7.7)
SODIUM BLD-SCNC: 135 MEQ/L (ref 135–145)
TRIGL SERPL-MCNC: 116 MG/DL (ref 0–199)
WBC # BLD: 9 THOU/MM3 (ref 4.8–10.8)

## 2018-03-07 PROCEDURE — 2580000003 HC RX 258: Performed by: INTERNAL MEDICINE

## 2018-03-07 PROCEDURE — 84132 ASSAY OF SERUM POTASSIUM: CPT

## 2018-03-07 PROCEDURE — 2500000003 HC RX 250 WO HCPCS: Performed by: INTERNAL MEDICINE

## 2018-03-07 PROCEDURE — 99232 SBSQ HOSP IP/OBS MODERATE 35: CPT | Performed by: INTERNAL MEDICINE

## 2018-03-07 PROCEDURE — 87147 CULTURE TYPE IMMUNOLOGIC: CPT

## 2018-03-07 PROCEDURE — 80061 LIPID PANEL: CPT

## 2018-03-07 PROCEDURE — 71250 CT THORAX DX C-: CPT

## 2018-03-07 PROCEDURE — 36415 COLL VENOUS BLD VENIPUNCTURE: CPT

## 2018-03-07 PROCEDURE — 1200000003 HC TELEMETRY R&B

## 2018-03-07 PROCEDURE — 87040 BLOOD CULTURE FOR BACTERIA: CPT

## 2018-03-07 PROCEDURE — 80069 RENAL FUNCTION PANEL: CPT

## 2018-03-07 PROCEDURE — 87804 INFLUENZA ASSAY W/OPTIC: CPT

## 2018-03-07 PROCEDURE — 85025 COMPLETE CBC W/AUTO DIFF WBC: CPT

## 2018-03-07 PROCEDURE — 6360000002 HC RX W HCPCS: Performed by: INTERNAL MEDICINE

## 2018-03-07 PROCEDURE — 82948 REAGENT STRIP/BLOOD GLUCOSE: CPT

## 2018-03-07 PROCEDURE — 83735 ASSAY OF MAGNESIUM: CPT

## 2018-03-07 PROCEDURE — 6370000000 HC RX 637 (ALT 250 FOR IP): Performed by: INTERNAL MEDICINE

## 2018-03-07 PROCEDURE — 84145 PROCALCITONIN (PCT): CPT

## 2018-03-07 PROCEDURE — 70551 MRI BRAIN STEM W/O DYE: CPT

## 2018-03-07 RX ORDER — PROMETHAZINE HYDROCHLORIDE 25 MG/1
25 SUPPOSITORY RECTAL EVERY 6 HOURS PRN
Status: DISCONTINUED | OUTPATIENT
Start: 2018-03-07 | End: 2018-03-11 | Stop reason: HOSPADM

## 2018-03-07 RX ORDER — OSELTAMIVIR PHOSPHATE 75 MG/1
75 CAPSULE ORAL 2 TIMES DAILY
Status: DISCONTINUED | OUTPATIENT
Start: 2018-03-07 | End: 2018-03-08

## 2018-03-07 RX ORDER — OSELTAMIVIR PHOSPHATE 30 MG/1
30 CAPSULE ORAL 2 TIMES DAILY
Status: DISCONTINUED | OUTPATIENT
Start: 2018-03-07 | End: 2018-03-07

## 2018-03-07 RX ORDER — SODIUM CHLORIDE 450 MG/100ML
INJECTION, SOLUTION INTRAVENOUS CONTINUOUS
Status: ACTIVE | OUTPATIENT
Start: 2018-03-07 | End: 2018-03-08

## 2018-03-07 RX ORDER — ACETAMINOPHEN 650 MG/1
650 SUPPOSITORY RECTAL EVERY 4 HOURS PRN
Status: DISCONTINUED | OUTPATIENT
Start: 2018-03-07 | End: 2018-03-10

## 2018-03-07 RX ADMIN — SODIUM CHLORIDE: 4.5 INJECTION, SOLUTION INTRAVENOUS at 12:46

## 2018-03-07 RX ADMIN — POTASSIUM CHLORIDE, DEXTROSE MONOHYDRATE AND SODIUM CHLORIDE: 150; 5; 450 INJECTION, SOLUTION INTRAVENOUS at 00:15

## 2018-03-07 RX ADMIN — GABAPENTIN 100 MG: 100 CAPSULE ORAL at 20:17

## 2018-03-07 RX ADMIN — OSELTAMIVIR PHOSPHATE 75 MG: 75 CAPSULE ORAL at 17:51

## 2018-03-07 RX ADMIN — ACETAMINOPHEN 650 MG: 650 SUPPOSITORY RECTAL at 16:06

## 2018-03-07 RX ADMIN — PIPERACILLIN SODIUM,TAZOBACTAM SODIUM 3.38 G: 3; .375 INJECTION, POWDER, FOR SOLUTION INTRAVENOUS at 16:05

## 2018-03-07 RX ADMIN — PIPERACILLIN SODIUM,TAZOBACTAM SODIUM 3.38 G: 3; .375 INJECTION, POWDER, FOR SOLUTION INTRAVENOUS at 22:42

## 2018-03-07 RX ADMIN — VANCOMYCIN HYDROCHLORIDE 1250 MG: 1 INJECTION, POWDER, LYOPHILIZED, FOR SOLUTION INTRAVENOUS at 19:40

## 2018-03-07 RX ADMIN — ATORVASTATIN CALCIUM 20 MG: 20 TABLET, FILM COATED ORAL at 20:17

## 2018-03-07 ASSESSMENT — PAIN DESCRIPTION - ORIENTATION: ORIENTATION: RIGHT;LEFT

## 2018-03-07 ASSESSMENT — PAIN SCALES - GENERAL
PAINLEVEL_OUTOF10: 0
PAINLEVEL_OUTOF10: 7
PAINLEVEL_OUTOF10: 0
PAINLEVEL_OUTOF10: 0
PAINLEVEL_OUTOF10: 7

## 2018-03-07 ASSESSMENT — PAIN DESCRIPTION - FREQUENCY: FREQUENCY: CONTINUOUS

## 2018-03-07 ASSESSMENT — PAIN DESCRIPTION - LOCATION: LOCATION: ARM

## 2018-03-07 ASSESSMENT — PAIN DESCRIPTION - PROGRESSION: CLINICAL_PROGRESSION: NOT CHANGED

## 2018-03-07 ASSESSMENT — PAIN DESCRIPTION - ONSET: ONSET: ON-GOING

## 2018-03-07 ASSESSMENT — PAIN DESCRIPTION - PAIN TYPE: TYPE: ACUTE PAIN

## 2018-03-07 NOTE — PROGRESS NOTES
Hospital Medicine Progress Note     Date:  3/7/2018    PCP: Radha Coleman MD (Tel: [phone])    Date of Admission: 3/2/2018      Brief hospital course: Pleasant 82F who has had multiple admissions as of recent - was admitted to [facility] for confusion, diagnosed as \"pneumonia\" and \"UTI\" recently. She was discharged to assisted living facility where she was thought to still be confused, sent to ER here for admission on same date she was discharged from [facility]. On presentation, she had unremarkable CXR. There was concern for possible Candida UTI and she was started on diflucan; urine culture did not grow any organism. CXR was unremarkable for significant infiltrate. She has hx of diabetes; has had hypoglycemia during inpatient stay here. Assessment:  Principal Problem:    Metabolic encephalopathy  Active Problems:    Hypoglycemia associated with diabetes ([facility]    Diabetes mellitus type 2, controlled ([facility]    S/P MVR (mitral valve replacement)    RA (rheumatoid arthritis) (HCC)    CAD (coronary artery disease)    Essential hypertension    Hyperlipidemia  Resolved Problems:    * No resolved hospital problems. *        Plan:  1. Encephalopathy - possibly encephalopathy vs underlying mild cognitive impairment; could also have underlying dementia. CT-head, MRI-brain with no significant findings consistent with CVA. CXR with no significant findings convincing for pneumonia - more of atelectasis vs residual infiltrate from recent \"pneumonia. \" Urinalysis unremarkable for infection; hence, diflucan that was initially ordered on admission discontinued. Ammonia level, B12, lactic acid, blood gas, CBC and BMP prety much unremarkable. She has had episodes of hypoglycemia during inpatient stay; suspect has had hypoglycemia as outpatient as well -  She was on glipizide, januvia and metformin for diabetes control; however, A1c is only 5.5 and she required some dextrose inpatient.  Streamlined home medications to minimize

## 2018-03-07 NOTE — CARE COORDINATION
3/7/18, 1:55 PM    1 [facility] does not have any beds. [facility] has beds, has reviewed patient's information and will accept at discharge. SW called and left message for patient's son Lydia Antes informing him of the above.

## 2018-03-08 ENCOUNTER — APPOINTMENT (OUTPATIENT)
Dept: ULTRASOUND IMAGING | Age: 83
DRG: 193 | End: 2018-03-08
Payer: MEDICARE

## 2018-03-08 PROBLEM — R50.9 ACUTE FEBRILE ILLNESS: Status: ACTIVE | Noted: 2018-03-08

## 2018-03-08 LAB
ALBUMIN SERPL-MCNC: 2.4 G/DL (ref 3.5–5.1)
ANION GAP SERPL CALCULATED.3IONS-SCNC: 9 MEQ/L (ref 8–16)
ANISOCYTOSIS: ABNORMAL
BASOPHILS # BLD: 0.9 %
BASOPHILS ABSOLUTE: 0.1 THOU/MM3 (ref 0–0.1)
BLOOD CULTURE, ROUTINE: NORMAL
BLOOD CULTURE, ROUTINE: NORMAL
BUN BLDV-MCNC: 9 MG/DL (ref 7–22)
CALCIUM SERPL-MCNC: 8.2 MG/DL (ref 8.5–10.5)
CHLORIDE BLD-SCNC: 99 MEQ/L (ref 98–111)
CO2: 24 MEQ/L (ref 23–33)
CREAT SERPL-MCNC: 0.7 MG/DL (ref 0.4–1.2)
EOSINOPHIL # BLD: 0.6 %
EOSINOPHILS ABSOLUTE: 0.1 THOU/MM3 (ref 0–0.4)
GFR SERPL CREATININE-BSD FRML MDRD: 80 ML/MIN/1.73M2
GLUCOSE BLD-MCNC: 101 MG/DL (ref 70–108)
GLUCOSE BLD-MCNC: 101 MG/DL (ref 70–108)
GLUCOSE BLD-MCNC: 105 MG/DL (ref 70–108)
GLUCOSE BLD-MCNC: 106 MG/DL (ref 70–108)
GLUCOSE BLD-MCNC: 109 MG/DL (ref 70–108)
HCT VFR BLD CALC: 32 % (ref 37–47)
HEMOGLOBIN: 10.3 GM/DL (ref 12–16)
LYMPHOCYTES # BLD: 9.5 %
LYMPHOCYTES ABSOLUTE: 1.2 THOU/MM3 (ref 1–4.8)
MAGNESIUM: 1.6 MG/DL (ref 1.6–2.4)
MCH RBC QN AUTO: 29.5 PG (ref 27–31)
MCHC RBC AUTO-ENTMCNC: 32.1 GM/DL (ref 33–37)
MCV RBC AUTO: 91.9 FL (ref 81–99)
MONOCYTES # BLD: 13.6 %
MONOCYTES ABSOLUTE: 1.7 THOU/MM3 (ref 0.4–1.3)
NUCLEATED RED BLOOD CELLS: 0 /100 WBC
PDW BLD-RTO: 23 % (ref 11.5–14.5)
PHOSPHORUS: 3 MG/DL (ref 2.4–4.7)
PLATELET # BLD: 246 THOU/MM3 (ref 130–400)
PMV BLD AUTO: 7.7 FL (ref 7.4–10.4)
POTASSIUM SERPL-SCNC: 3.9 MEQ/L (ref 3.5–5.2)
RBC # BLD: 3.48 MILL/MM3 (ref 4.2–5.4)
SEG NEUTROPHILS: 75.4 %
SEGMENTED NEUTROPHILS ABSOLUTE COUNT: 9.5 THOU/MM3 (ref 1.8–7.7)
SODIUM BLD-SCNC: 132 MEQ/L (ref 135–145)
WBC # BLD: 12.6 THOU/MM3 (ref 4.8–10.8)

## 2018-03-08 PROCEDURE — 97110 THERAPEUTIC EXERCISES: CPT

## 2018-03-08 PROCEDURE — 6370000000 HC RX 637 (ALT 250 FOR IP): Performed by: INTERNAL MEDICINE

## 2018-03-08 PROCEDURE — 6360000002 HC RX W HCPCS: Performed by: INTERNAL MEDICINE

## 2018-03-08 PROCEDURE — 82948 REAGENT STRIP/BLOOD GLUCOSE: CPT

## 2018-03-08 PROCEDURE — 2580000003 HC RX 258: Performed by: INTERNAL MEDICINE

## 2018-03-08 PROCEDURE — 99232 SBSQ HOSP IP/OBS MODERATE 35: CPT | Performed by: INTERNAL MEDICINE

## 2018-03-08 PROCEDURE — 76705 ECHO EXAM OF ABDOMEN: CPT

## 2018-03-08 PROCEDURE — 92610 EVALUATE SWALLOWING FUNCTION: CPT

## 2018-03-08 PROCEDURE — 83735 ASSAY OF MAGNESIUM: CPT

## 2018-03-08 PROCEDURE — 36415 COLL VENOUS BLD VENIPUNCTURE: CPT

## 2018-03-08 PROCEDURE — 1200000003 HC TELEMETRY R&B

## 2018-03-08 PROCEDURE — 85025 COMPLETE CBC W/AUTO DIFF WBC: CPT

## 2018-03-08 PROCEDURE — 80069 RENAL FUNCTION PANEL: CPT

## 2018-03-08 RX ORDER — DOCUSATE SODIUM 100 MG/1
100 CAPSULE, LIQUID FILLED ORAL DAILY
Status: DISCONTINUED | OUTPATIENT
Start: 2018-03-08 | End: 2018-03-11 | Stop reason: HOSPADM

## 2018-03-08 RX ORDER — HYDROCODONE BITARTRATE AND ACETAMINOPHEN 5; 325 MG/1; MG/1
1 TABLET ORAL EVERY 8 HOURS PRN
Status: DISCONTINUED | OUTPATIENT
Start: 2018-03-08 | End: 2018-03-11 | Stop reason: HOSPADM

## 2018-03-08 RX ORDER — SODIUM CHLORIDE 450 MG/100ML
INJECTION, SOLUTION INTRAVENOUS CONTINUOUS
Status: DISCONTINUED | OUTPATIENT
Start: 2018-03-08 | End: 2018-03-08

## 2018-03-08 RX ORDER — POLYETHYLENE GLYCOL 3350 17 G/17G
17 POWDER, FOR SOLUTION ORAL DAILY PRN
Status: DISCONTINUED | OUTPATIENT
Start: 2018-03-08 | End: 2018-03-11 | Stop reason: HOSPADM

## 2018-03-08 RX ADMIN — Medication 10 ML: at 20:42

## 2018-03-08 RX ADMIN — ASPIRIN 81 MG: 81 TABLET ORAL at 09:32

## 2018-03-08 RX ADMIN — ENOXAPARIN SODIUM 40 MG: 40 INJECTION SUBCUTANEOUS at 09:31

## 2018-03-08 RX ADMIN — CLOPIDOGREL BISULFATE 75 MG: 75 TABLET, FILM COATED ORAL at 09:33

## 2018-03-08 RX ADMIN — AMLODIPINE BESYLATE 5 MG: 5 TABLET ORAL at 09:32

## 2018-03-08 RX ADMIN — PIPERACILLIN SODIUM,TAZOBACTAM SODIUM 3.38 G: 3; .375 INJECTION, POWDER, FOR SOLUTION INTRAVENOUS at 06:12

## 2018-03-08 RX ADMIN — PIPERACILLIN SODIUM,TAZOBACTAM SODIUM 3.38 G: 3; .375 INJECTION, POWDER, FOR SOLUTION INTRAVENOUS at 17:35

## 2018-03-08 RX ADMIN — ATENOLOL 50 MG: 50 TABLET ORAL at 09:33

## 2018-03-08 RX ADMIN — GABAPENTIN 100 MG: 100 CAPSULE ORAL at 09:31

## 2018-03-08 RX ADMIN — ISOSORBIDE MONONITRATE 30 MG: 30 TABLET ORAL at 09:33

## 2018-03-08 RX ADMIN — ACETAMINOPHEN 650 MG: 325 TABLET ORAL at 09:31

## 2018-03-08 RX ADMIN — GABAPENTIN 100 MG: 100 CAPSULE ORAL at 20:42

## 2018-03-08 RX ADMIN — PYRIDOXINE HCL TAB 50 MG 50 MG: 50 TAB at 09:32

## 2018-03-08 RX ADMIN — MAGNESIUM HYDROXIDE 30 ML: 400 SUSPENSION ORAL at 12:48

## 2018-03-08 RX ADMIN — VANCOMYCIN HYDROCHLORIDE 1250 MG: 1 INJECTION, POWDER, LYOPHILIZED, FOR SOLUTION INTRAVENOUS at 23:13

## 2018-03-08 RX ADMIN — VITAMIN D, TAB 1000IU (100/BT) 1000 UNITS: 25 TAB at 09:36

## 2018-03-08 RX ADMIN — MAGNESIUM 64 MG (MAGNESIUM CHLORIDE) TABLET,DELAYED RELEASE 535 MG: at 09:34

## 2018-03-08 RX ADMIN — DOCUSATE SODIUM 100 MG: 100 CAPSULE, LIQUID FILLED ORAL at 23:13

## 2018-03-08 RX ADMIN — SODIUM CHLORIDE: 4.5 INJECTION, SOLUTION INTRAVENOUS at 09:32

## 2018-03-08 RX ADMIN — ATORVASTATIN CALCIUM 20 MG: 20 TABLET, FILM COATED ORAL at 20:42

## 2018-03-08 RX ADMIN — Medication 150 MG: at 09:31

## 2018-03-08 ASSESSMENT — PAIN SCALES - GENERAL
PAINLEVEL_OUTOF10: 0
PAINLEVEL_OUTOF10: 4

## 2018-03-08 NOTE — PROGRESS NOTES
Pts granddaughter is visiting and has updated this nurse on her grandmother. The pt has been declining over last 8 months. Not ambulating more sedentary, decrease in appetite and just not herself. She mentioned she has been increasingly been intermittently confused at times and has been getting worse.

## 2018-03-08 NOTE — PROGRESS NOTES
Meds:    piperacillin-tazobactam  3.375 g Intravenous Q8H    insulin lispro  0-6 Units Subcutaneous TID WC    insulin lispro  0-3 Units Subcutaneous Nightly    atorvastatin  20 mg Oral Nightly    pneumococcal 13-valent conjugate  0.5 mL Intramuscular Once    sodium chloride flush  10 mL Intravenous 2 times per day    enoxaparin  40 mg Subcutaneous Daily    amLODIPine  5 mg Oral Daily    aspirin  81 mg Oral Daily    atenolol  50 mg Oral Daily    clopidogrel  75 mg Oral Daily    gabapentin  100 mg Oral BID    isosorbide mononitrate  30 mg Oral Daily    magnesium chloride  535 mg Oral Daily    iron polysaccharides  150 mg Oral Daily    vitamin B-6  50 mg Oral Daily    vitamin D  1,000 Units Oral Daily       Infusions:    dextrose           PRN Meds: HYDROcodone 5 mg - acetaminophen, acetaminophen, promethazine, sodium chloride flush, acetaminophen, magnesium hydroxide, ondansetron, magnesium sulfate, potassium chloride **OR** potassium chloride **OR** potassium chloride, glucose, dextrose, glucagon (rDNA), dextrose    Labs/imaging:  CBC:   Recent Labs      03/06/18   1243  03/07/18   0805  03/08/18   0545   WBC  6.3  9.0  12.6*   HGB  10.4*  10.6*  10.3*   PLT  239  248  246         BMP:    Recent Labs      03/06/18   1243  03/07/18   0600  03/07/18   1029  03/08/18   0545   NA  136  135   --   132*   K  4.2  6.3*  4.6  3.9   CL  102  104   --   99   CO2  25  20*   --   24   BUN  10  8   --   9   CREATININE  0.7  0.6   --   0.7   GLUCOSE  116*  104   --   101         Hepatic:   Recent Labs      03/06/18   1243   AST  18   ALT  12   BILITOT  0.4   ALKPHOS  42           Keri Ballesteros MD  -------------------------------  Rounding hospitalist

## 2018-03-08 NOTE — PROGRESS NOTES
[address] [address]  INPATIENT PHYSICAL THERAPY  DAILY NOTE  [facility] RENAL TELEMETRY 6K - 6K-03/003-A    Time In: 2414  Time Out: 0828  Timed Code Treatment Minutes: 17 Minutes  Minutes: 17          Date: 3/8/2018  Patient Name: Javier Velarde,  Gender:  female        MRN: [id number]  : 1935  ([de-identified] y.o.)     Referring Practitioner: POOJA Mckeon MD  Diagnosis: Altered Mental Status  Additional Pertinent Hx: Per ED note, pt is a [de-identified] y.o. female who presents to the ED for evaluation of altered mental status. The patient states that she was sent here by the nursing home for possible confusion and concern for a UTI. The patient denies a fever or urinary symptoms. She reports mild shortness of breath. The patient's daughter reports that the patient seems to be acting and talking normal. She states that the patient is usually ambulatory but the nursing home reported to her that she's been weak lately and has been using a wheelchair. Past Medical History:   Diagnosis Date    Anemia     Arthritis     CAD (coronary artery disease)     Cataract     Cellulitis     Cerebral artery occlusion with cerebral infarction ([address]     Diabetes mellitus type 2, controlled ([address]     Essential hypertension     Hyperlipidemia     MI (mitral incompetence)     Staph infection     Vitamin D deficiency      Past Surgical History:   Procedure Laterality Date    [facility]  09    [facility]    FOOT SURGERY  1/2003    x2    JOINT REPLACEMENT      left shoulder    OTHER SURGICAL HISTORY  3/13/2013    LEFT REVERSE SHOULDER ARTHROPLASTY       Restrictions/Precautions:  General Precautions, Fall Risk     Subjective:     Subjective: RN approved session. Pt in bed upon arrival and agrees to therapy. Pt alert and oriented however unable to state birthday year and to describe pain.  Pt participating in supine therex ~50% of the time- grimacing and calling out throughout however unable to confirm or deny

## 2018-03-08 NOTE — FLOWSHEET NOTE
03/08/18 1855   Provider Notification   Reason for Communication Review case   Provider Name [facility]   Provider Notification Physician   Method of Communication Secure Message   Notification Time 0438   [id number] received call from [facility] pharmacy 1 aerobic has came back gram positive cocci in clusters, pharmacy thinks this is a contaminated bottle

## 2018-03-08 NOTE — PLAN OF CARE
Problem: Falls - Risk of  Goal: Absence of falls  Outcome: Met This Shift  No falls this shift. Bed rails up 3/4. Turned pt every 2 hours. Call light in reach. Hourly rounding and more frequent. Problem: Risk for Impaired Skin Integrity  Goal: Tissue integrity - skin and mucous membranes  Structural intactness and normal physiological function of skin and  mucous membranes. Outcome: Ongoing  Deep tissue injury. turned and repositioned pt every 2 hours. Protective barrier cream applied. No open areas. Skin assessment every shift. Problem: Urinary Elimination:  Goal: Signs and symptoms of infection will decrease  Signs and symptoms of infection will decrease   Outcome: Ongoing  Arreola intact with arreola care this shift. Will continue to observe arreola for accurate intake and output and clarity of urine. Problem: Pain:  Goal: Pain level will decrease  Pain level will decrease   Outcome: Not Met This Shift  Using face for pain scale. Increased with movement of hands. Elevated hands on pillow for comfort. Problem: Discharge Planning:  Goal: Discharged to appropriate level of care  Discharged to appropriate level of care   Outcome: Not Met This Shift  Plan is for d/c to ECF when treatment is complete. Problem: Mental Status - Impaired:  Goal: Mental status will improve  Mental status will improve   Outcome: Not Met This Shift  Hourly evaluation of cognitive status. Pt is bale to speak clearly and answer questions towards end of shift when tempeture is decreasing down to 100.7. Pt is smiling. Problem: DISCHARGE BARRIERS  Goal: Patient's continuum of care needs are met  Outcome: Ongoing  Arreola care completed. Ongoing plan with [facility] upon d/c for ECF placement. Comments: Care plan reviewed with patient and daughter. Patient and daughter verbalize understanding of the plan of care and contribute to goal setting.   Electronically signed by Dena Acevedo RN on 3/7/2018 at 8:33 PM

## 2018-03-08 NOTE — PROGRESS NOTES
Impaired AP movement [] Pocketing Left   [] Pocketing Right  [] Lingual Pumping  [x] Impaired Mastication   [x] Reduced Bolus Formation [x] Impaired Oral Initiation    [] OTHER:    PHARYNGEAL PHASE: [] WFL: Pharyngeal phase appears WFLs, but cannot completely rule out pharyngeal phase deficits from a bedside swallow evaluation alone. [x] Impaired   [x] Delayed Swallow  [x] Decreased Hyolaryngeal Elevation [x] Audible Swallow   [] Suspected Pharyngeal Residue due to spontaneous multiple swallow. [] OTHER:    SIGNS AND SYMPTOMS OF LARYNGEAL PENETRATION / ASPIRATION:  [x] NO sign/symptoms of aspiration evident with this evaluation, but cannot rule out silent aspiration. [] Throat Clear  [] Immediate Cough [] Delayed Cough [] Wet Vocal Quality  [] Change in Pulmonary Status  [] OTHER:    IMPRESSIONS: Pt presents with at least moderate oral dysphagia, mild pharyngeal dysphagia based on findings listed above. Labored breathing noted at baseline; RN Crow indicates lung sounds clear at this time. Decreased labial seal on utensil for transferring bolus to oral cavity with poor lateralization of bolus for manipulation; delayed AP transit with pt requiring intermittent verbal cues to elicit swallow trigger. Poor oral sensation for bolus detected. Min oral stasis spread diffusely throughout cavity, cleared upon liquid assist. Thin liquids via spoon, cup, and straw with no overt s/s aspiration; silent aspiration unable to be r/o. Due to high level of confusion presenting, pt is at HIGH risk for aspiration and potential swallow decompensation. Recommend conservative diet level of dysphagia I with thin liquids given direct 1:1 assistance for safety; implement additional swallow strategies listed below. Skilled ST services are medically necessary to decrease risk of medical decline, aspiration, and for establishing least restrictive diet.      *high level of confusion exhibited with decreased awareness to immediate surroundings and functional communication for wants/needs; plan to complete cognitive evaluation to develop goals per POC as appropriate    RECOMMENDATIONS:     Modified Barium Swallow: [] Is indicated to further assess    [x] Is NOT indicated at this time; Will recommend as  appropriate. DIET RECOMMENDATIONS:  Dysphagia 1 with thin liquids    STRATEGIES: [] Strategies pending MBS results. [x] Full upright position  [x] Small bite/sip [] No Straw [] Multiple Swallow  [] Chin tuck [] Head turn [x] Pulmonary monitoring [x] Oral care after all meals  [] Supervision  [x] Medication in applesauce - crushed  [x]Direct 1:1 Supervision  [] Spoon all liquids [x] Alternate solid / liquid [x] Limit distractions [x] Monitor for fatigue  [] PMV in place for all po [] OTHER:      EDUCATION:   Learner: [x]Patient [] Significant other [] Son/Daughter [] Parent     [] Other:   Education: [x] Reviewed results and recommendations of this evaluation     [x] Reviewed diet and strategies     [] Reviewed signs, symptoms and risk of aspiration     [] Demonstrated how to thick liquid appropriately. [x] Reviewed goals and Plan of Care     [] OTHER:   Method: [x] Discussion [] Demonstration [] Hand-out     [] OTHER:   Evaluation of Education:     [] Verbalizes understanding [] Demonstrates with assistance     [] Demonstrates without assistance [x]Needs further instruction     [x] No evidence of learning  [x] Family not present    PATIENT GOALS: [x] Pt did not state. Will further assess during treatment. [] Return to the least restricted diet possible     [] Return to previous level of function     [] OTHER:    PLAN / TREATMENT RECOMMENDATIONS:  [x] Speech Therapy evaluation to assess speech, language, cognition and/or voice  [x] Skilled SLP intervention on acute care 3-5 x per week or until goals met and/or pt plateaus in function.   Specific interventions for next session may include: diet analysis, pulmonary monitoring,

## 2018-03-09 ENCOUNTER — APPOINTMENT (OUTPATIENT)
Dept: GENERAL RADIOLOGY | Age: 83
DRG: 193 | End: 2018-03-09
Payer: MEDICARE

## 2018-03-09 PROBLEM — K76.89 SIMPLE HEPATIC CYST: Status: ACTIVE | Noted: 2018-03-09

## 2018-03-09 LAB
ALLEN TEST: POSITIVE
ANISOCYTOSIS: ABNORMAL
BASE EXCESS (CALCULATED): -0.1 MMOL/L (ref -2.5–2.5)
BASOPHILS # BLD: 0.8 %
BASOPHILS ABSOLUTE: 0.1 THOU/MM3 (ref 0–0.1)
BLOOD CULTURE, ROUTINE: ABNORMAL
BLOOD CULTURE, ROUTINE: ABNORMAL
COLLECTED BY:: 4648
DEVICE: NORMAL
EOSINOPHIL # BLD: 1.7 %
EOSINOPHILS ABSOLUTE: 0.1 THOU/MM3 (ref 0–0.4)
GLUCOSE BLD-MCNC: 100 MG/DL (ref 70–108)
GLUCOSE BLD-MCNC: 126 MG/DL (ref 70–108)
GLUCOSE BLD-MCNC: 84 MG/DL (ref 70–108)
GLUCOSE BLD-MCNC: 99 MG/DL (ref 70–108)
HCO3: 25 MMOL/L (ref 23–28)
HCT VFR BLD CALC: 29.7 % (ref 37–47)
HEMOGLOBIN: 9.6 GM/DL (ref 12–16)
LYMPHOCYTES # BLD: 12.1 %
LYMPHOCYTES ABSOLUTE: 0.9 THOU/MM3 (ref 1–4.8)
MCH RBC QN AUTO: 30.3 PG (ref 27–31)
MCHC RBC AUTO-ENTMCNC: 32.3 GM/DL (ref 33–37)
MCV RBC AUTO: 93.6 FL (ref 81–99)
MONOCYTES # BLD: 14.3 %
MONOCYTES ABSOLUTE: 1.1 THOU/MM3 (ref 0.4–1.3)
NUCLEATED RED BLOOD CELLS: 0 /100 WBC
O2 SATURATION: 94 %
ORGANISM: ABNORMAL
PCO2: 43 MMHG (ref 35–45)
PDW BLD-RTO: 21.3 % (ref 11.5–14.5)
PH BLOOD GAS: 7.38 (ref 7.35–7.45)
PLATELET # BLD: 267 THOU/MM3 (ref 130–400)
PMV BLD AUTO: 8.7 FL (ref 7.4–10.4)
PO2: 71 MMHG (ref 71–104)
RBC # BLD: 3.17 MILL/MM3 (ref 4.2–5.4)
SEG NEUTROPHILS: 71.1 %
SEGMENTED NEUTROPHILS ABSOLUTE COUNT: 5.3 THOU/MM3 (ref 1.8–7.7)
SOURCE, BLOOD GAS: NORMAL
WBC # BLD: 7.5 THOU/MM3 (ref 4.8–10.8)

## 2018-03-09 PROCEDURE — 2580000003 HC RX 258: Performed by: INTERNAL MEDICINE

## 2018-03-09 PROCEDURE — 6370000000 HC RX 637 (ALT 250 FOR IP): Performed by: INTERNAL MEDICINE

## 2018-03-09 PROCEDURE — 36600 WITHDRAWAL OF ARTERIAL BLOOD: CPT

## 2018-03-09 PROCEDURE — 99232 SBSQ HOSP IP/OBS MODERATE 35: CPT | Performed by: INTERNAL MEDICINE

## 2018-03-09 PROCEDURE — 36415 COLL VENOUS BLD VENIPUNCTURE: CPT

## 2018-03-09 PROCEDURE — 71045 X-RAY EXAM CHEST 1 VIEW: CPT

## 2018-03-09 PROCEDURE — 92523 SPEECH SOUND LANG COMPREHEN: CPT

## 2018-03-09 PROCEDURE — 82948 REAGENT STRIP/BLOOD GLUCOSE: CPT

## 2018-03-09 PROCEDURE — 1200000003 HC TELEMETRY R&B

## 2018-03-09 PROCEDURE — 85025 COMPLETE CBC W/AUTO DIFF WBC: CPT

## 2018-03-09 PROCEDURE — 82803 BLOOD GASES ANY COMBINATION: CPT

## 2018-03-09 PROCEDURE — 6360000002 HC RX W HCPCS: Performed by: INTERNAL MEDICINE

## 2018-03-09 RX ADMIN — ISOSORBIDE MONONITRATE 30 MG: 30 TABLET ORAL at 09:51

## 2018-03-09 RX ADMIN — PYRIDOXINE HCL TAB 50 MG 50 MG: 50 TAB at 09:51

## 2018-03-09 RX ADMIN — Medication 10 ML: at 09:53

## 2018-03-09 RX ADMIN — POLYETHYLENE GLYCOL 3350 17 G: 17 POWDER, FOR SOLUTION ORAL at 09:50

## 2018-03-09 RX ADMIN — MAGNESIUM 64 MG (MAGNESIUM CHLORIDE) TABLET,DELAYED RELEASE 535 MG: at 09:52

## 2018-03-09 RX ADMIN — VITAMIN D, TAB 1000IU (100/BT) 1000 UNITS: 25 TAB at 09:51

## 2018-03-09 RX ADMIN — CLOPIDOGREL BISULFATE 75 MG: 75 TABLET, FILM COATED ORAL at 09:51

## 2018-03-09 RX ADMIN — ATORVASTATIN CALCIUM 20 MG: 20 TABLET, FILM COATED ORAL at 22:04

## 2018-03-09 RX ADMIN — GABAPENTIN 100 MG: 100 CAPSULE ORAL at 22:04

## 2018-03-09 RX ADMIN — GABAPENTIN 100 MG: 100 CAPSULE ORAL at 09:51

## 2018-03-09 RX ADMIN — PIPERACILLIN SODIUM,TAZOBACTAM SODIUM 3.38 G: 3; .375 INJECTION, POWDER, FOR SOLUTION INTRAVENOUS at 02:17

## 2018-03-09 RX ADMIN — ASPIRIN 81 MG: 81 TABLET ORAL at 09:52

## 2018-03-09 RX ADMIN — Medication 10 ML: at 22:04

## 2018-03-09 RX ADMIN — ENOXAPARIN SODIUM 40 MG: 40 INJECTION SUBCUTANEOUS at 09:50

## 2018-03-09 RX ADMIN — VANCOMYCIN HYDROCHLORIDE 1250 MG: 1 INJECTION, POWDER, LYOPHILIZED, FOR SOLUTION INTRAVENOUS at 22:06

## 2018-03-09 RX ADMIN — ATENOLOL 50 MG: 50 TABLET ORAL at 09:51

## 2018-03-09 RX ADMIN — DOCUSATE SODIUM 100 MG: 100 CAPSULE, LIQUID FILLED ORAL at 09:51

## 2018-03-09 RX ADMIN — Medication 150 MG: at 09:51

## 2018-03-09 RX ADMIN — AMLODIPINE BESYLATE 5 MG: 5 TABLET ORAL at 09:51

## 2018-03-09 RX ADMIN — PIPERACILLIN SODIUM,TAZOBACTAM SODIUM 3.38 G: 3; .375 INJECTION, POWDER, FOR SOLUTION INTRAVENOUS at 09:51

## 2018-03-09 RX ADMIN — PIPERACILLIN SODIUM,TAZOBACTAM SODIUM 3.38 G: 3; .375 INJECTION, POWDER, FOR SOLUTION INTRAVENOUS at 17:27

## 2018-03-09 NOTE — PROGRESS NOTES
Hospital Medicine Progress Note     Date:  3/9/2018    PCP: Jonathan Crews MD (Tel: [phone])    Date of Admission: 3/2/2018      Brief hospital course: Pleasant 82F who has had multiple admissions as of recent - was admitted to [facility] for confusion, diagnosed as \"pneumonia\" and \"UTI\" recently. She was discharged to assisted living facility where she was thought to still be confused, sent to ER here for admission on same date she was discharged from [facility]. On presentation, she had unremarkable CXR. There was concern for possible Candida UTI and she was started on diflucan; urine culture did not grow any organism. CXR was unremarkable for significant infiltrate. She has hx of diabetes; has had hypoglycemia during inpatient stay here. Assessment:  Principal Problem:    Metabolic encephalopathy  Active Problems:    Hypoglycemia associated with diabetes ([facility])    Acute febrile illness, fever documented 3/7    Diabetes mellitus type 2, controlled ([facility])    S/P MVR (mitral valve replacement)    RA (rheumatoid arthritis) ([facility])    CAD (coronary artery disease)    Essential hypertension    Hyperlipidemia    Simple hepatic cyst, left, measures 9.8 by 11.9 by 10.8 cm - from abdominal US 3/9/2018  Resolved Problems:    * No resolved hospital problems. *        Plan:  1. Encephalopathy - possibly encephalopathy vs underlying mild cognitive impairment; could also have underlying dementia. CT-head, MRI-brain with no significant findings consistent with CVA. CXR with no significant findings convincing for pneumonia - more of atelectasis vs residual infiltrate from recent \"pneumonia. \" Urinalysis unremarkable for infection; hence, diflucan that was initially ordered on admission discontinued. Ammonia level, B12, lactic acid, blood gas, CBC and BMP prety much unremarkable.  She has had episodes of hypoglycemia during inpatient stay; suspect has had hypoglycemia as outpatient as well -  She was on glipizide, [place] and [place] and Net           880.74 ml     I/O last 3 completed shifts: In: 1530.7 [P.O.:290; I.V.:1240.7]  Out: 650 [Urine:650]  No intake/output data recorded.       Meds:    docusate sodium  100 mg Oral Daily    vancomycin  1,250 mg Intravenous Q24H    vancomycin (VANCOCIN) intermittent dosing (placeholder)   Other RX Placeholder    piperacillin-tazobactam  3.375 g Intravenous Q8H    insulin lispro  0-6 Units Subcutaneous TID WC    insulin lispro  0-3 Units Subcutaneous Nightly    atorvastatin  20 mg Oral Nightly    pneumococcal 13-valent conjugate  0.5 mL Intramuscular Once    sodium chloride flush  10 mL Intravenous 2 times per day    enoxaparin  40 mg Subcutaneous Daily    amLODIPine  5 mg Oral Daily    aspirin  81 mg Oral Daily    atenolol  50 mg Oral Daily    clopidogrel  75 mg Oral Daily    gabapentin  100 mg Oral BID    isosorbide mononitrate  30 mg Oral Daily    magnesium chloride  535 mg Oral Daily    iron polysaccharides  150 mg Oral Daily    vitamin B-6  50 mg Oral Daily    vitamin D  1,000 Units Oral Daily       Infusions:    dextrose           PRN Meds: HYDROcodone 5 mg - acetaminophen, polyethylene glycol, acetaminophen, promethazine, sodium chloride flush, acetaminophen, magnesium hydroxide, ondansetron, magnesium sulfate, potassium chloride **OR** potassium chloride **OR** potassium chloride, glucose, dextrose, glucagon (rDNA), dextrose    Labs/imaging:  CBC:   Recent Labs      03/07/18   0805  03/08/18   0545  03/09/18   0440   WBC  9.0  12.6*  7.5   HGB  10.6*  10.3*  9.6*   PLT  248  246  267         BMP:    Recent Labs      03/06/18   1243  03/07/18   0600  03/07/18   1029  03/08/18   0545   NA  136  135   --   132*   K  4.2  6.3*  4.6  3.9   CL  102  104   --   99   CO2  25  20*   --   24   BUN  10  8   --   9   CREATININE  0.7  0.6   --   0.7   GLUCOSE  116*  104   --   101         Hepatic:   Recent Labs      03/06/18   1243   AST  18   ALT  12   BILITOT  0.4   ALKPHOS  42 Anne Dover MD  -------------------------------  Rounding hospitalist

## 2018-03-09 NOTE — PROGRESS NOTES
Saw pt at bedside. Confusion not new. Denies SOB. Lungs clear; unchanged to mornig eval. CXR, blood gas. Saturating well on room air.   SIGNED: Cathy Dsouza MD . 3/9/2018

## 2018-03-09 NOTE — PROGRESS NOTES
[facility]  SPEECH THERAPY  STRZ RENAL TELEMETRY 6K  Speech  Language  Cognitive Evaluation    SLP Individual Minutes  Time In: 3322  Time Out: 7964  Minutes: 12  Timed Code Treatment Minutes: 0 Minutes       Date: 3/9/2018  Patient Name: Jay Lopez      MRN: [id number]    : 1935  ([de-identified] y.o.)  Gender: female   Referring Physician:  Dr. Saji Cruz  Diagnosis: AMS  Secondary Diagnosis: Dysphagia, cognitive linguistic deficits  Date of Last MBS:  BSE, 3/8/18  Diet:  Dysphagia I and thin liquids    History of Present Illness/Injury: Pt admitted to [facility] with above med dx. Please see physician H&P for full details. ST consulted to assess cognitive linguistic functioning due to continued high level of confusions presenting. Past Medical History:   Diagnosis Date    Anemia     Arthritis     CAD (coronary artery disease)     Cataract     Cellulitis     Cerebral artery occlusion with cerebral infarction ([address]     Diabetes mellitus type 2, controlled ([address]     Essential hypertension     Hyperlipidemia     MI (mitral incompetence)     Staph infection     Vitamin D deficiency        Precautions: Fall risk  Pain:  0/10    Subjective:  Pt alert and pleasant with confusion. Cooperative with clinician and POC. SOCIAL HISTORY: Per pt report, pt resided at [facility] long term receiving therapy services prior to discharge to home setting with spouse. Has 3 children in the immediate area to provide support as necessary. Indicated prior independence for all ADLs. Both pt and spouse share responsibility for management of medications and finances; prior driving in the community. Wears glasses, no hearing aids.  Due to decreased mentation level, recommend verifying pt's verbalizations with familial members upon arrival.     ORAL MOTOR:  Labial / Facial:  Generalized weakness  Lingual: Decreased ROM and strength  Soft Palate: DNT  Sensation: DNT  Dentition: Lower partial; decaying natural dentition SPEECH / VOICE:  Speech: adequate articulatory precision with speech intelligibility approximating 95% in conversation; no dysarthria  Voice: adequate vocal intensity; no aphonia or dysphonia     LANGUAGE:  Receptive:  1 step commands: 2/3  2 step commands: 0/2  Simple yes/no: 2/3  Complex yes/no: 0/3    Expressive:  Automatic speech: WFL numerical counting 1-10  Sentence Completion (automatic): 3/3  Confrontational namin/4  Responsive namin/2  Divergent naming (concrete): 0 items produced despite max cues given  Sentence Formulation: Impaired  Conversational speech: Impaired  Paraphasias: None evidenced  Repetition: 2/2     COGNITION:  *limited cognitive evaluation due to altered mental status and severity of deficits with receptive/expressive skills  Orientation: 0/6  Problem solving: Impaired  Reasoning: Impaired  Thought organization: Impaired  Insight: Poor  Attention: Impaired basic sustained attention; required re-direction cues    SWALLOWING:  BSE completed on 3/8/18 with recommendations for dysphagia I and thin liquids; please see full report for further details. RN Crow indicates safe toleration of established diet with no acute changes in pulmonary status. Will continue addressing per POC. IMPRESSIONS: Pt presents with at least severe cognitive linguistic deficits in the areas of orientation, problem solving, safety awareness, functional carryover, basic attention, processing speed, and spoken language expression/comprehension. Decreased ability to communicate wants/needs with delayed utterances and perseverative speech. Auditory stimuli required repetition/rephrases to ensure comprehension with communication breakdowns occurring. Pt highly delayed in verbalizations with episodes of non-sensical speech. Strengths include responses to social pleasantries/greetings and skilled multi-modal cues.  Skilled ST services are medically necessary to decrease risk of medical decline, social isolation, and for improving functional communication measures. REHAB POTENTIAL: [] Excellent [x] Good [] Fair  [] Poor    EDUCATION:   Learner: [x]Patient [] Significant other [] Son/Daughter [] Parent     [] Other:   Education: [x] Reviewed results and recommendations of this evaluation     [] Reviewed diet and strategies     [] Reviewed signs, symptoms and risk of aspiration     [] Demonstrated how to thick liquid appropriately. [] Reviewed goals and Plan of Care     [] OTHER:   Method: [x] Discussion [] Demonstration [] Hand-out     [] OTHER:   Evaluation of Education:     [] Verbalizes understanding [] Demonstrates with assistance     [] Demonstrates without assistance [x]Needs further instruction     [x] No evidence of learning  [x] Family not present    PLAN / TREATMENT RECOMMENDATIONS:  [x] Skilled SLP intervention on acute care 3-5 x per week or until goals met and/or pt plateaus in function. Specific interventions for next session may include: diet analysis, cognitive tasks      SHORT TERM GOALS:  Short-term Goals  Timeframe for Short-term Goals: two weeks  Goal 1: Pt will safely tolerate dysphagia I with thin liquid diet with no s/s aspiration to maximize nutrition/hydration measures. Goal 2: Pt will safely tolerate advanced texture trials 10/10 times to permit potential diet advancement. Goal 3: Monitor pulmonary status; complete formal instrumentation if compromise detected. Goal 4: Pt will complete spoken language expression (speech naming) and comprehension (yes/no questions, 2 step commands) tasks with 70% accuracy given mod cues to improve functional communication for wants/needs. Goal 5: Pt will complete BASIC recall tasks (orientation, call light) with 70% accuracy given mod cues to improve awareness and participation in current setting.          Victorino Shankar M.A., [address] [place] [address]

## 2018-03-10 LAB
ALBUMIN SERPL-MCNC: 2.1 G/DL (ref 3.5–5.1)
ANION GAP SERPL CALCULATED.3IONS-SCNC: 11 MEQ/L (ref 8–16)
ANISOCYTOSIS: ABNORMAL
BASOPHILIA: SLIGHT
BASOPHILS # BLD: 1.1 %
BASOPHILS ABSOLUTE: 0.1 THOU/MM3 (ref 0–0.1)
BUN BLDV-MCNC: 9 MG/DL (ref 7–22)
CALCIUM SERPL-MCNC: 8.5 MG/DL (ref 8.5–10.5)
CHLORIDE BLD-SCNC: 102 MEQ/L (ref 98–111)
CO2: 23 MEQ/L (ref 23–33)
CREAT SERPL-MCNC: 0.7 MG/DL (ref 0.4–1.2)
EOSINOPHIL # BLD: 2.7 %
EOSINOPHILS ABSOLUTE: 0.2 THOU/MM3 (ref 0–0.4)
GFR SERPL CREATININE-BSD FRML MDRD: 80 ML/MIN/1.73M2
GLUCOSE BLD-MCNC: 100 MG/DL (ref 70–108)
GLUCOSE BLD-MCNC: 118 MG/DL (ref 70–108)
GLUCOSE BLD-MCNC: 80 MG/DL (ref 70–108)
GLUCOSE BLD-MCNC: 85 MG/DL (ref 70–108)
GLUCOSE BLD-MCNC: 86 MG/DL (ref 70–108)
HCT VFR BLD CALC: 31.1 % (ref 37–47)
HEMOGLOBIN: 9.9 GM/DL (ref 12–16)
LYMPHOCYTES # BLD: 13 %
LYMPHOCYTES ABSOLUTE: 0.9 THOU/MM3 (ref 1–4.8)
MAGNESIUM: 1.9 MG/DL (ref 1.6–2.4)
MCH RBC QN AUTO: 30.1 PG (ref 27–31)
MCHC RBC AUTO-ENTMCNC: 31.8 GM/DL (ref 33–37)
MCV RBC AUTO: 94.5 FL (ref 81–99)
MONOCYTES # BLD: 12.6 %
MONOCYTES ABSOLUTE: 0.9 THOU/MM3 (ref 0.4–1.3)
NUCLEATED RED BLOOD CELLS: 0 /100 WBC
OVALOCYTES: ABNORMAL
PDW BLD-RTO: 21.7 % (ref 11.5–14.5)
PHOSPHORUS: 2.4 MG/DL (ref 2.4–4.7)
PLATELET # BLD: 289 THOU/MM3 (ref 130–400)
PLATELET ESTIMATE: ADEQUATE
PMV BLD AUTO: 8.1 FL (ref 7.4–10.4)
POIKILOCYTES: ABNORMAL
POTASSIUM SERPL-SCNC: 4 MEQ/L (ref 3.5–5.2)
RBC # BLD: 3.29 MILL/MM3 (ref 4.2–5.4)
SCAN OF BLOOD SMEAR: NORMAL
SEG NEUTROPHILS: 70.6 %
SEGMENTED NEUTROPHILS ABSOLUTE COUNT: 4.8 THOU/MM3 (ref 1.8–7.7)
SODIUM BLD-SCNC: 136 MEQ/L (ref 135–145)
WBC # BLD: 6.8 THOU/MM3 (ref 4.8–10.8)

## 2018-03-10 PROCEDURE — 36415 COLL VENOUS BLD VENIPUNCTURE: CPT

## 2018-03-10 PROCEDURE — 2580000003 HC RX 258: Performed by: INTERNAL MEDICINE

## 2018-03-10 PROCEDURE — 2500000003 HC RX 250 WO HCPCS: Performed by: INTERNAL MEDICINE

## 2018-03-10 PROCEDURE — 6370000000 HC RX 637 (ALT 250 FOR IP): Performed by: INTERNAL MEDICINE

## 2018-03-10 PROCEDURE — 83735 ASSAY OF MAGNESIUM: CPT

## 2018-03-10 PROCEDURE — 99232 SBSQ HOSP IP/OBS MODERATE 35: CPT | Performed by: INTERNAL MEDICINE

## 2018-03-10 PROCEDURE — 6360000002 HC RX W HCPCS: Performed by: INTERNAL MEDICINE

## 2018-03-10 PROCEDURE — 85025 COMPLETE CBC W/AUTO DIFF WBC: CPT

## 2018-03-10 PROCEDURE — 1200000003 HC TELEMETRY R&B

## 2018-03-10 PROCEDURE — 82948 REAGENT STRIP/BLOOD GLUCOSE: CPT

## 2018-03-10 PROCEDURE — 80069 RENAL FUNCTION PANEL: CPT

## 2018-03-10 RX ORDER — POLYETHYLENE GLYCOL 3350 17 G/17G
17 POWDER, FOR SOLUTION ORAL DAILY PRN
Qty: 527 G | Refills: 0
Start: 2018-03-10 | End: 2018-04-09

## 2018-03-10 RX ORDER — AMOXICILLIN AND CLAVULANATE POTASSIUM 500; 125 MG/1; MG/1
1 TABLET, FILM COATED ORAL EVERY 8 HOURS SCHEDULED
Status: DISCONTINUED | OUTPATIENT
Start: 2018-03-10 | End: 2018-03-11 | Stop reason: HOSPADM

## 2018-03-10 RX ORDER — OXYCODONE HYDROCHLORIDE AND ACETAMINOPHEN 5; 325 MG/1; MG/1
1 TABLET ORAL EVERY 8 HOURS PRN
Status: DISCONTINUED | OUTPATIENT
Start: 2018-03-10 | End: 2018-03-11 | Stop reason: HOSPADM

## 2018-03-10 RX ORDER — GABAPENTIN 100 MG/1
100 CAPSULE ORAL 2 TIMES DAILY
Qty: 60 CAPSULE | Refills: 0
Start: 2018-03-10 | End: 2018-04-09

## 2018-03-10 RX ORDER — ATORVASTATIN CALCIUM 20 MG/1
20 TABLET, FILM COATED ORAL NIGHTLY
Qty: 30 TABLET | Refills: 0
Start: 2018-03-10

## 2018-03-10 RX ORDER — PSEUDOEPHEDRINE HCL 30 MG
100 TABLET ORAL DAILY
Qty: 60 CAPSULE | Refills: 0
Start: 2018-03-10

## 2018-03-10 RX ADMIN — AMOXICILLIN AND CLAVULANATE POTASSIUM 1 TABLET: 500; 125 TABLET, FILM COATED ORAL at 10:00

## 2018-03-10 RX ADMIN — PIPERACILLIN SODIUM,TAZOBACTAM SODIUM 3.38 G: 3; .375 INJECTION, POWDER, FOR SOLUTION INTRAVENOUS at 02:25

## 2018-03-10 RX ADMIN — DOCUSATE SODIUM 100 MG: 100 CAPSULE, LIQUID FILLED ORAL at 10:02

## 2018-03-10 RX ADMIN — AMOXICILLIN AND CLAVULANATE POTASSIUM 1 TABLET: 500; 125 TABLET, FILM COATED ORAL at 14:51

## 2018-03-10 RX ADMIN — GABAPENTIN 100 MG: 100 CAPSULE ORAL at 10:00

## 2018-03-10 RX ADMIN — AMOXICILLIN AND CLAVULANATE POTASSIUM 1 TABLET: 500; 125 TABLET, FILM COATED ORAL at 20:33

## 2018-03-10 RX ADMIN — AMLODIPINE BESYLATE 5 MG: 5 TABLET ORAL at 10:00

## 2018-03-10 RX ADMIN — VITAMIN D, TAB 1000IU (100/BT) 1000 UNITS: 25 TAB at 10:00

## 2018-03-10 RX ADMIN — ATORVASTATIN CALCIUM 20 MG: 20 TABLET, FILM COATED ORAL at 20:31

## 2018-03-10 RX ADMIN — Medication 10 ML: at 10:04

## 2018-03-10 RX ADMIN — Medication 10 ML: at 20:33

## 2018-03-10 RX ADMIN — MICONAZOLE NITRATE: 2 POWDER TOPICAL at 20:33

## 2018-03-10 RX ADMIN — GABAPENTIN 100 MG: 100 CAPSULE ORAL at 20:31

## 2018-03-10 RX ADMIN — CLOPIDOGREL BISULFATE 75 MG: 75 TABLET, FILM COATED ORAL at 10:00

## 2018-03-10 RX ADMIN — OXYCODONE HYDROCHLORIDE AND ACETAMINOPHEN 1 TABLET: 5; 325 TABLET ORAL at 16:04

## 2018-03-10 RX ADMIN — ENOXAPARIN SODIUM 40 MG: 40 INJECTION SUBCUTANEOUS at 09:59

## 2018-03-10 RX ADMIN — Medication 150 MG: at 10:00

## 2018-03-10 RX ADMIN — MAGNESIUM 64 MG (MAGNESIUM CHLORIDE) TABLET,DELAYED RELEASE 535 MG: at 10:03

## 2018-03-10 RX ADMIN — PYRIDOXINE HCL TAB 50 MG 50 MG: 50 TAB at 10:00

## 2018-03-10 RX ADMIN — ISOSORBIDE MONONITRATE 30 MG: 30 TABLET ORAL at 10:00

## 2018-03-10 RX ADMIN — ASPIRIN 81 MG: 81 TABLET ORAL at 10:00

## 2018-03-10 RX ADMIN — ATENOLOL 50 MG: 50 TABLET ORAL at 10:00

## 2018-03-10 ASSESSMENT — PAIN SCALES - GENERAL: PAINLEVEL_OUTOF10: 6

## 2018-03-10 NOTE — PROGRESS NOTES
Essential hypertension    Hyperlipidemia    Simple hepatic cyst, left, measures 9.8 by 11.9 by 10.8 cm - from abdominal US 3/9/2018  Resolved Problems:    * No resolved hospital problems. *        Plan:  1. Encephalopathy - possibly encephalopathy vs underlying mild cognitive impairment; could also have underlying dementia. CT-head, MRI-brain with no significant findings consistent with CVA. CXR with no significant findings convincing for pneumonia - more of atelectasis vs residual infiltrate from recent \"pneumonia. \" Urinalysis unremarkable for infection; hence, diflucan that was initially ordered on admission discontinued. Ammonia level, B12, lactic acid, blood gas, CBC and BMP prety much unremarkable. She has had episodes of hypoglycemia during inpatient stay; suspect has had hypoglycemia as outpatient as well -  She was on glipizide, januvia and metformin for diabetes control; however, A1c is only 5.5 and she required some dextrose inpatient. Streamlined home medications to minimize interactions from polypharmacy - discontinued clonidine, sanctura and buspar. Diabetes medications discontinued as patient does not appear to be requiring medications for diabetes. 2. Febrile illness, possible bacterial pneumonia. CT-chest, resulted 3/8 with small left-sided pleural effusion, mild left basilar dependent passive atelectasis or mild pneumonia. She has not had significant cough so far. Rapid influenza screen negative, although with only about 50% sensitivity. Stopped tamiflu (3/7-3/8). Blood culture, 1 out of 2, with coagulase negative Staph. Patient was initially on vanc and zosyn; later de-escalated to augmentin to complete treatment for possible pneumonia. 3. In short, patient may have underlying dementia per discussion with children who indicate ongoing confusion for months, although recently worse. Will monitor closely and see how she does with these changes.      Diet: DIET DYSPHAGIA I PUREED;    Code status:  vitamin D  1,000 Units Oral Daily       Infusions:    dextrose           PRN Meds: HYDROcodone 5 mg - acetaminophen, polyethylene glycol, promethazine, sodium chloride flush, acetaminophen, magnesium hydroxide, ondansetron, magnesium sulfate, potassium chloride **OR** potassium chloride **OR** potassium chloride, glucose, dextrose, glucagon (rDNA), dextrose    Labs/imaging:  CBC:   Recent Labs      03/08/18   0545  03/09/18   0440  03/10/18   0607   WBC  12.6*  7.5  6.8   HGB  10.3*  9.6*  9.9*   PLT  246  267  289         BMP:    Recent Labs      03/07/18   1029  03/08/18   0545  03/10/18   0607   NA   --   132*  136   K  4.6  3.9  4.0   CL   --   99  102   CO2   --   24  23   BUN   --   9  9   CREATININE   --   0.7  0.7   GLUCOSE   --   101  80         Hepatic:   No results for input(s): AST, ALT, ALB, BILITOT, ALKPHOS in the last 72 hours.         Liz Butler MD  -------------------------------  [facility] hospitalist

## 2018-03-10 NOTE — PLAN OF CARE
Problem: Falls - Risk of  Goal: Absence of falls  Outcome: Ongoing  No falls noted at this time. Bed in low position. Call light within reach.      Problem: Risk for Impaired Skin Integrity  Goal: Tissue integrity - skin and mucous membranes  Structural intactness and normal physiological function of skin and  mucous membranes. Outcome: Ongoing  No open areas noted to bony prominence. Pt turned and repositioned every 2hr and prn.      Problem: Urinary Elimination:  Goal: Signs and symptoms of infection will decrease  Signs and symptoms of infection will decrease   Outcome: Ongoing  No signs and symptoms of infection noted.      Problem: Pain:  Goal: Pain level will decrease  Pain level will decrease   Outcome: Ongoing  No concerns or pain voiced. Pt able to make needs known and use call system.      Problem: Discharge Planning:  Goal: Discharged to appropriate level of care  Discharged to appropriate level of care   Outcome: Ongoing  Pt plans to be discharged back to [facility] at time of discharge.      Problem: Mental Status - Impaired:  Goal: Mental status will improve  Mental status will improve   Outcome: Ongoing  Pt is alert to name and place at this time. Pt does have a history of confusion.      Problem: DISCHARGE BARRIERS  Goal: Patient's continuum of care needs are met  Outcome: Ongoing  Care needs met. Pt able to make needs known and use call system.      Comments: Care plan reviewed with patient. Patient verbalize understanding of the plan of care and contribute to goal setting.

## 2018-03-11 VITALS
WEIGHT: 192 LBS | SYSTOLIC BLOOD PRESSURE: 125 MMHG | TEMPERATURE: 97.7 F | DIASTOLIC BLOOD PRESSURE: 61 MMHG | OXYGEN SATURATION: 92 % | HEIGHT: 63 IN | HEART RATE: 73 BPM | RESPIRATION RATE: 30 BRPM | BODY MASS INDEX: 34.02 KG/M2

## 2018-03-11 LAB
ALBUMIN SERPL-MCNC: 2.1 G/DL (ref 3.5–5.1)
ANION GAP SERPL CALCULATED.3IONS-SCNC: 12 MEQ/L (ref 8–16)
ANISOCYTOSIS: ABNORMAL
BASOPHILIA: SLIGHT
BASOPHILS # BLD: 0.7 %
BASOPHILS ABSOLUTE: 0.1 THOU/MM3 (ref 0–0.1)
BUN BLDV-MCNC: 9 MG/DL (ref 7–22)
CALCIUM SERPL-MCNC: 8.4 MG/DL (ref 8.5–10.5)
CHLORIDE BLD-SCNC: 100 MEQ/L (ref 98–111)
CO2: 21 MEQ/L (ref 23–33)
CREAT SERPL-MCNC: 0.7 MG/DL (ref 0.4–1.2)
ELLIPTOCYTES: ABNORMAL
EOSINOPHIL # BLD: 3.4 %
EOSINOPHILS ABSOLUTE: 0.2 THOU/MM3 (ref 0–0.4)
GFR SERPL CREATININE-BSD FRML MDRD: 80 ML/MIN/1.73M2
GLUCOSE BLD-MCNC: 75 MG/DL (ref 70–108)
GLUCOSE BLD-MCNC: 81 MG/DL (ref 70–108)
GLUCOSE BLD-MCNC: 86 MG/DL (ref 70–108)
HCT VFR BLD CALC: 32 % (ref 37–47)
HEMOGLOBIN: 10.1 GM/DL (ref 12–16)
LYMPHOCYTES # BLD: 16.3 %
LYMPHOCYTES ABSOLUTE: 1.2 THOU/MM3 (ref 1–4.8)
MAGNESIUM: 2.1 MG/DL (ref 1.6–2.4)
MCH RBC QN AUTO: 29.6 PG (ref 27–31)
MCHC RBC AUTO-ENTMCNC: 31.5 GM/DL (ref 33–37)
MCV RBC AUTO: 94 FL (ref 81–99)
MONOCYTES # BLD: 11.9 %
MONOCYTES ABSOLUTE: 0.9 THOU/MM3 (ref 0.4–1.3)
NUCLEATED RED BLOOD CELLS: 0 /100 WBC
OVALOCYTES: ABNORMAL
PDW BLD-RTO: 22.2 % (ref 11.5–14.5)
PHOSPHORUS: 2.5 MG/DL (ref 2.4–4.7)
PLATELET # BLD: 388 THOU/MM3 (ref 130–400)
PLATELET ESTIMATE: ADEQUATE
PMV BLD AUTO: 8.2 FL (ref 7.4–10.4)
POIKILOCYTES: ABNORMAL
POTASSIUM SERPL-SCNC: 4.6 MEQ/L (ref 3.5–5.2)
RBC # BLD: 3.41 MILL/MM3 (ref 4.2–5.4)
SCAN OF BLOOD SMEAR: NORMAL
SEG NEUTROPHILS: 67.7 %
SEGMENTED NEUTROPHILS ABSOLUTE COUNT: 4.9 THOU/MM3 (ref 1.8–7.7)
SODIUM BLD-SCNC: 133 MEQ/L (ref 135–145)
WBC # BLD: 7.2 THOU/MM3 (ref 4.8–10.8)

## 2018-03-11 PROCEDURE — 6370000000 HC RX 637 (ALT 250 FOR IP): Performed by: INTERNAL MEDICINE

## 2018-03-11 PROCEDURE — 85025 COMPLETE CBC W/AUTO DIFF WBC: CPT

## 2018-03-11 PROCEDURE — 83735 ASSAY OF MAGNESIUM: CPT

## 2018-03-11 PROCEDURE — 82948 REAGENT STRIP/BLOOD GLUCOSE: CPT

## 2018-03-11 PROCEDURE — 6360000002 HC RX W HCPCS: Performed by: INTERNAL MEDICINE

## 2018-03-11 PROCEDURE — 99239 HOSP IP/OBS DSCHRG MGMT >30: CPT | Performed by: INTERNAL MEDICINE

## 2018-03-11 PROCEDURE — 36415 COLL VENOUS BLD VENIPUNCTURE: CPT

## 2018-03-11 PROCEDURE — 2580000003 HC RX 258: Performed by: INTERNAL MEDICINE

## 2018-03-11 PROCEDURE — 80069 RENAL FUNCTION PANEL: CPT

## 2018-03-11 RX ORDER — OXYCODONE HYDROCHLORIDE AND ACETAMINOPHEN 5; 325 MG/1; MG/1
1 TABLET ORAL EVERY 8 HOURS PRN
Qty: 9 TABLET | Refills: 0 | Status: SHIPPED | OUTPATIENT
Start: 2018-03-11 | End: 2018-03-14

## 2018-03-11 RX ORDER — AMOXICILLIN AND CLAVULANATE POTASSIUM 500; 125 MG/1; MG/1
1 TABLET, FILM COATED ORAL EVERY 8 HOURS SCHEDULED
Qty: 9 TABLET | Refills: 0
Start: 2018-03-11 | End: 2018-03-14

## 2018-03-11 RX ADMIN — MAGNESIUM 64 MG (MAGNESIUM CHLORIDE) TABLET,DELAYED RELEASE 535 MG: at 15:07

## 2018-03-11 RX ADMIN — ATENOLOL 50 MG: 50 TABLET ORAL at 09:37

## 2018-03-11 RX ADMIN — ENOXAPARIN SODIUM 40 MG: 40 INJECTION SUBCUTANEOUS at 09:37

## 2018-03-11 RX ADMIN — CLOPIDOGREL BISULFATE 75 MG: 75 TABLET, FILM COATED ORAL at 09:37

## 2018-03-11 RX ADMIN — VITAMIN D, TAB 1000IU (100/BT) 1000 UNITS: 25 TAB at 09:37

## 2018-03-11 RX ADMIN — PYRIDOXINE HCL TAB 50 MG 50 MG: 50 TAB at 09:37

## 2018-03-11 RX ADMIN — MICONAZOLE NITRATE: 2 POWDER TOPICAL at 09:45

## 2018-03-11 RX ADMIN — ISOSORBIDE MONONITRATE 30 MG: 30 TABLET ORAL at 09:37

## 2018-03-11 RX ADMIN — AMLODIPINE BESYLATE 5 MG: 5 TABLET ORAL at 09:37

## 2018-03-11 RX ADMIN — Medication 150 MG: at 09:38

## 2018-03-11 RX ADMIN — GABAPENTIN 100 MG: 100 CAPSULE ORAL at 09:37

## 2018-03-11 RX ADMIN — AMOXICILLIN AND CLAVULANATE POTASSIUM 1 TABLET: 500; 125 TABLET, FILM COATED ORAL at 15:07

## 2018-03-11 RX ADMIN — AMOXICILLIN AND CLAVULANATE POTASSIUM 1 TABLET: 500; 125 TABLET, FILM COATED ORAL at 05:15

## 2018-03-11 RX ADMIN — ASPIRIN 81 MG: 81 TABLET ORAL at 09:37

## 2018-03-11 RX ADMIN — OXYCODONE HYDROCHLORIDE AND ACETAMINOPHEN 1 TABLET: 5; 325 TABLET ORAL at 09:37

## 2018-03-11 RX ADMIN — Medication 10 ML: at 09:46

## 2018-03-11 RX ADMIN — DOCUSATE SODIUM 100 MG: 100 CAPSULE, LIQUID FILLED ORAL at 09:37

## 2018-03-11 ASSESSMENT — PAIN SCALES - GENERAL: PAINLEVEL_OUTOF10: 5

## 2018-03-11 NOTE — PROGRESS NOTES
Discharge teaching and instructions for diagnosis/procedure of altered mental status completed with patient using teachback method. AVS reviewed. Printed prescriptions given to patient. Patient voiced understanding regarding prescriptions, follow up appointments, and care of self at home. Discharged via cart/stretcher to  skilled nursing per EMS transportation     Report called [facility] to Summer.    Faxed paper work to [phone]

## 2018-03-11 NOTE — DISCHARGE SUMMARY
Hypoglycemia associated with diabetes ([facility]    Acute febrile illness, fever documented 3/7    Diabetes mellitus type 2, controlled (HCC)    S/P MVR (mitral valve replacement)    RA (rheumatoid arthritis) (HCC)    CAD (coronary artery disease)    Essential hypertension    Hyperlipidemia    Simple hepatic cyst, left, measures 9.8 by 11.9 by 10.8 cm - from abdominal US 3/9/2018  Resolved Problems:    * No resolved hospital problems. *      Physical Exam: /70   Pulse 74   Temp 98.9 °F (37.2 °C) (Oral)   Resp 18   Ht 5' 3\" (1.6 m)   Wt 192 lb (87.1 kg)   SpO2 97%   BMI 34.01 kg/m²   Gen/overall appearance: Not in acute distress. Alert. Oriented. Head: Normocephalic, atraumatic  Eyes: EOMI, good acuity  ENT:- Oral mucosa moist  Neck: No JVD, thyromegaly  CVS: Nml S1S2, no MRG, RRR  Pulm: Clear bilaterally. No crackles/wheezes  Gastrointestinal: Soft, NT/ND, +BS  Musculoskeletal: Trace LE edema. Warm  Neuro: No focal deficit. Moves extremity spontaneously. Psychiatry: Appropriate affect. Not agitated. Skin: Warm, dry with normal turgor. No rash        Significant diagnostic studies that may require follow up:  Ct Head Wo Contrast    Result Date: 3/7/2018  PROCEDURE: CT HEAD WO CONTRAST CLINICAL INFORMATION: AMS. COMPARISON: None TECHNIQUE: Noncontrast 5 mm axial images were obtained through the brain. All CT scans at this facility use dose modulation, iterative reconstruction, and/or weight-based dosing when appropriate to reduce radiation dose to as low as reasonably achievable. FINDINGS: Brain: There is no acute ischemic infarct, hemorrhage, midline shift, mass, or mass effect. Moderate deep white matter small vessel chronic ischemic changes are noted. There is age appropriate cortical atrophy. Ventricles: The ventricles, cisterns and cortical sulci are enlarged concordant with the mild to moderate degree of age-appropriate atrophy. No obstructive hydrocephalus.  Skull base/calvarium: Unremarkable Scalp pleural effusion with mild left basilar dependent passive atelectasis or mild pneumonia. 2. Large cystic appearing lesion within the upper abdomen of indeterminate etiology. This is incompletely visualized and incompletely characterized on the current examination. This measures at least 11.7 x 11.4 cm in dimension. This may represent a large  hepatic cyst. **This report has been created using voice recognition software. It may contain minor errors which are inherent in voice recognition technology. ** Final report electronically signed by Dr. Rafita Liriano on 3/8/2018 10:51 AM    Xr Chest Portable    Result Date: 3/10/2018  PROCEDURE: XR CHEST PORTABLE CLINICAL INFORMATION: Shortness of breath, COMPARISON: 3/6/2018 TECHNIQUE: A single mobile view of the chest was obtained. 1. Very poor inflation of lungs. Borderline heart size. Metallic sternotomy sutures. Marked degenerative changes right shoulder. Left shoulder prosthesis. 2. Minimal residual left basilar atelectasis/pneumonia, improved from prior study. **This report has been created using voice recognition software. It may contain minor errors which are inherent in voice recognition technology. ** Final report electronically signed by Dr. Winnie Callaway on 3/10/2018 9:30 AM    Xr Chest Portable    Result Date: 3/6/2018  PROCEDURE: XR CHEST PORTABLE CLINICAL INFORMATION: Altered mental status. Cough. COMPARISON: 3/2/2018 TECHNIQUE: A single mobile view of the chest was obtained. 1. Poor inflation of lungs. Mild cardiac megaly. Metallic sternotomy sutures. Reverse left shoulder prosthesis. 2. Suggestion of very mild atelectasis/pneumonia right perihilar region and both lung bases. No effusion is seen. **This report has been created using voice recognition software. It may contain minor errors which are inherent in voice recognition technology. ** Final report electronically signed by Dr. Winnie Callaway on 3/6/2018 2:32 PM    Xr Chest Portable    Result Date: findings are as discussed in the body  of the report. **This report has been created using voice recognition software. It may contain minor errors which are inherent in voice recognition technology. ** Final report electronically signed by Dr. Tina Lambert on 3/7/2018 10:01 AM            Treatments: As above. Discharge Medications:     Medication List      START taking these medications    amoxicillin-clavulanate 500-125 MG per tablet  Commonly known as:  AUGMENTIN  Take 1 tablet by mouth every 8 hours for 3 days     atorvastatin 20 MG tablet  Commonly known as:  LIPITOR  Take 1 tablet by mouth nightly     docusate 100 MG Caps  Commonly known as:  COLACE, DULCOLAX  Take 100 mg by mouth daily     miconazole 2 % powder  Commonly known as:  MICOTIN  Apply twice a day to groin and under breast.     oxyCODONE-acetaminophen 5-325 MG per tablet  Commonly known as:  PERCOCET  Take 1 tablet by mouth every 8 hours as needed for Pain for up to 3 days. polyethylene glycol packet  Commonly known as:  GLYCOLAX  Take 17 g by mouth daily as needed for Constipation        CHANGE how you take these medications    gabapentin 100 MG capsule  Commonly known as:  NEURONTIN  Take 1 capsule by mouth 2 times daily for 30 days.   What changed:  · medication strength  · how much to take  · when to take this        CONTINUE taking these medications    acetaminophen 500 MG tablet  Commonly known as:  TYLENOL     amLODIPine 5 MG tablet  Commonly known as:  NORVASC     aspirin 81 MG tablet     atenolol 50 MG tablet  Commonly known as:  TENORMIN     CALCIUM CITRATE + D3 PO     clopidogrel 75 MG tablet  Commonly known as:  PLAVIX     isosorbide mononitrate 30 MG extended release tablet  Commonly known as:  IMDUR     methotrexate 2.5 MG chemo tablet  Commonly known as:  RHEUMATREX     POLY-IRON 150 PO     predniSONE 10 MG tablet  Commonly known as:  DELTASONE     PX SENIOR VITAMIN PO     SLOW-MAG PO     VITAMIN B6 PO     Vitamin D 1000

## 2018-03-12 NOTE — PROGRESS NOTES
H&P at [facility]      NAME: Alexi Sky  DATE: 03/15/18  ROOM #: 29-2  CODE STATUS: FULL CODE  REASON FOR ADMISSION: Weakness and deconditioning after hospitalization. : 1935  ADMISSION DATE: 3/11/2018  SKILLED PATIENT: Yes    History obtained from chart review, the patient and nursing staff. SUBJECTIVE:  HPI: Alexi Sky is a [de-identified] y.o. female. Pt seen and examined at bedside. Patient admitted to [facility] from 3/2 to 3/11 for AMS. D/c summary:  Brief hospital course: Pleasant 82F who has had multiple admissions as of recent - was admitted to [facility] for confusion, diagnosed as \"pneumonia\" and \"UTI\" recently. She was discharged to assisted living facility where she was thought to still be confused, sent to ER here for admission on same date she was discharged from [facility]. On presentation, she had unremarkable CXR, although follow up chest CT was concerning for possible atelectasis vs infiltrates. There was concern for possible Candida UTI and she was started on diflucan; urine culture did not grow any organism. CXR was unremarkable for significant infiltrate. She has hx of diabetes; has had hypoglycemia during inpatient stay here. Diabetes is well-controlled, with HbA1c of 5.5 and patient did not require insulin or medicine for diabetes during inpatient stay. Her home diabetes medicine will be discontinued on discharge, as she appears to be diet-controlled. She was on antibiotics for possible pneumonia - initially on vanc/zosyn, de-escalated to augmentin and despite fever X1 day, she has remained afebrile for days, as of this time. She will complete 7 days of antibiotics for possible pneumonia. Encephalopathy does remain stable, and following discussion with patient's children, she's had ongoing confusion for months, appear to be worsening as of recent. There is concern that this may be related to previously undiagnosed dementia, as family had suspected. MRI-brain was unremarkable for CVA. History     Social History    Marital status:      Spouse name: N/A    Number of children: N/A    Years of education: N/A     Occupational History    Not on file. Social History Main Topics    Smoking status: Never Smoker    Smokeless tobacco: Never Used    Alcohol use No    Drug use: No    Sexual activity: Not on file     Other Topics Concern    Not on file     Social History Narrative    No narrative on file         No family history on file. I have reviewed the patient's past medical history, past surgical history, allergies, medications, social and family history and I have made updates where appropriate.       Review of Systems  Positive responses are highlighted in bold    Constitutional:  Fever, Chills, Night Sweats, Fatigue, Unexpected changes in weight  Eyes:  Eye discharge, Eye pain, Eye redness, Visual disturbances   HENT:  Ear pain, Tinnitus, Nosebleeds, Trouble swallowing, Hearing loss, Sore throat  Cardiovascular:  Chest Pain, Palpitations, Orthopnea, Paroxysmal Nocturnal Dyspnea  Respiratory:  Cough, Wheezing, Shortness of breath, Chest tightness, Apnea  Gastrointestinal:  Nausea, Vomiting, Diarrhea, Constipation, Heartburn, Blood in stool  Genitourinary:  Difficulty or painful urination, Flank pain, Change in frequency, Urgency  Skin:  Color change, Rash, Itching, Wound  Psychiatric:  Hallucinations, Anxiety, Depression, Suicidal ideation  Hematological:  Enlarged glands, Easy bleeding, Easily bruising  Musculoskeletal:  Joint pain, Back pain, Gait problems, Joint swelling, Myalgias  Neurological:  Dizziness, Headaches, Presyncope, Numbness, Seizures, Tremors  Allergy:  Environmental allergies, Food allergies  Endocrine:  Heat Intolerance, Cold Intolerance, Polydipsia, Polyphagia, Polyuria      PHYSICAL EXAM:  Vitals:    03/15/18 0445   BP: 135/87   Pulse: 81   Resp: 18   Temp: 98.7 °F (37.1 °C)   Weight: 192 lb (87.1 kg)   Height: 5' 3\" (1.6 m)     Body mass index is 34.01 technology. **       Final report electronically signed by Dr. Artemio Porter on 3/9/2018 5:41 AM       Narrative   PROCEDURE: XR CHEST PORTABLE       CLINICAL INFORMATION: Shortness of breath,        COMPARISON: 3/6/2018       TECHNIQUE: A single mobile view of the chest was obtained.               Impression   1. Very poor inflation of lungs. Borderline heart size. Metallic sternotomy sutures. Marked degenerative changes right shoulder. Left shoulder prosthesis. 2. Minimal residual left basilar atelectasis/pneumonia, improved from prior study.               **This report has been created using voice recognition software.  It may contain minor errors which are inherent in voice recognition technology. **       Final report electronically signed by Dr. Keena Arndt on 3/10/2018 9:30 AM       ASSESSMENT & PLAN  1. Altered mental status, unspecified altered mental status type    Some improvement  Likely with underling dementia  Lab w/u unremarkable  Will get RPR to round-out the dementia w/u  Reassess 4 wks. Palliative care consult placed to meet with family to determine code status etc.     2. Bacterial pneumonia    Improved clinically, done with Augmentin  Repeat CXR in 6 wks    3. Simple hepatic cyst, left, measures 9.8 by 11.9 by 10.8 cm - from abdominal US 3/9/2018    Benign appearance on imagine. No symptoms. No further w/u required    4. Hyponatremia    Repeat labs WNL  Monitor labs periodically     5. Physical deconditioning    con't PT/OT    6. Controlled type 2 diabetes mellitus with complication, without long-term current use of insulin (HCC)    Off all meds  Will check chem sticks for 5 days to see how sugars are doing and determine if any intervention would be needed. I'm not anticipating her needing much if anything. 7. Essential hypertension    At goal  con't regimen  Monitor. 8. Dyslipidemia    con't Lipitor for now    9.  ASCVD (arteriosclerotic cardiovascular disease)    con't ASa,

## 2018-03-13 LAB — BLOOD CULTURE, ROUTINE: NORMAL

## 2018-03-15 ENCOUNTER — CLINICAL DOCUMENTATION (OUTPATIENT)
Dept: FAMILY MEDICINE CLINIC | Age: 83
End: 2018-03-15

## 2018-03-15 VITALS
HEIGHT: 63 IN | HEART RATE: 81 BPM | BODY MASS INDEX: 34.02 KG/M2 | SYSTOLIC BLOOD PRESSURE: 135 MMHG | RESPIRATION RATE: 18 BRPM | DIASTOLIC BLOOD PRESSURE: 87 MMHG | TEMPERATURE: 98.7 F | WEIGHT: 192 LBS

## 2018-03-15 DIAGNOSIS — R53.81 PHYSICAL DECONDITIONING: ICD-10-CM

## 2018-03-15 DIAGNOSIS — E55.9 VITAMIN D DEFICIENCY: ICD-10-CM

## 2018-03-15 DIAGNOSIS — J15.9 BACTERIAL PNEUMONIA: ICD-10-CM

## 2018-03-15 DIAGNOSIS — M06.9 RHEUMATOID ARTHRITIS INVOLVING MULTIPLE SITES, UNSPECIFIED RHEUMATOID FACTOR PRESENCE: ICD-10-CM

## 2018-03-15 DIAGNOSIS — I25.10 ASCVD (ARTERIOSCLEROTIC CARDIOVASCULAR DISEASE): ICD-10-CM

## 2018-03-15 DIAGNOSIS — D64.9 NORMOCYTIC ANEMIA: ICD-10-CM

## 2018-03-15 DIAGNOSIS — E11.8 CONTROLLED TYPE 2 DIABETES MELLITUS WITH COMPLICATION, WITHOUT LONG-TERM CURRENT USE OF INSULIN (HCC): ICD-10-CM

## 2018-03-15 DIAGNOSIS — K76.89 SIMPLE HEPATIC CYST: ICD-10-CM

## 2018-03-15 DIAGNOSIS — I10 ESSENTIAL HYPERTENSION: ICD-10-CM

## 2018-03-15 DIAGNOSIS — R41.82 ALTERED MENTAL STATUS, UNSPECIFIED ALTERED MENTAL STATUS TYPE: Primary | ICD-10-CM

## 2018-03-15 DIAGNOSIS — E78.5 DYSLIPIDEMIA: ICD-10-CM

## 2018-03-15 DIAGNOSIS — E87.1 HYPONATREMIA: ICD-10-CM

## 2018-04-12 ENCOUNTER — CLINICAL DOCUMENTATION (OUTPATIENT)
Dept: FAMILY MEDICINE CLINIC | Age: 83
End: 2018-04-12

## 2018-04-12 VITALS
DIASTOLIC BLOOD PRESSURE: 84 MMHG | HEART RATE: 79 BPM | TEMPERATURE: 98.5 F | WEIGHT: 192 LBS | RESPIRATION RATE: 18 BRPM | BODY MASS INDEX: 34.02 KG/M2 | SYSTOLIC BLOOD PRESSURE: 121 MMHG | HEIGHT: 63 IN

## 2018-04-12 DIAGNOSIS — R41.82 ALTERED MENTAL STATUS, UNSPECIFIED ALTERED MENTAL STATUS TYPE: Primary | ICD-10-CM

## 2018-04-12 DIAGNOSIS — I25.10 ASCVD (ARTERIOSCLEROTIC CARDIOVASCULAR DISEASE): ICD-10-CM

## 2018-04-12 DIAGNOSIS — K76.89 SIMPLE HEPATIC CYST: ICD-10-CM

## 2018-04-12 DIAGNOSIS — E11.8 CONTROLLED TYPE 2 DIABETES MELLITUS WITH COMPLICATION, WITHOUT LONG-TERM CURRENT USE OF INSULIN (HCC): ICD-10-CM

## 2018-04-12 DIAGNOSIS — E78.5 DYSLIPIDEMIA: ICD-10-CM

## 2018-04-12 DIAGNOSIS — E55.9 VITAMIN D DEFICIENCY: ICD-10-CM

## 2018-04-12 DIAGNOSIS — E87.1 HYPONATREMIA: ICD-10-CM

## 2018-04-12 DIAGNOSIS — M06.9 RHEUMATOID ARTHRITIS INVOLVING MULTIPLE SITES, UNSPECIFIED RHEUMATOID FACTOR PRESENCE: ICD-10-CM

## 2018-04-12 DIAGNOSIS — I10 ESSENTIAL HYPERTENSION: ICD-10-CM

## 2018-04-12 DIAGNOSIS — J15.9 BACTERIAL PNEUMONIA: ICD-10-CM

## 2018-04-12 DIAGNOSIS — D64.9 NORMOCYTIC ANEMIA: ICD-10-CM

## 2018-04-12 DIAGNOSIS — R53.81 PHYSICAL DECONDITIONING: ICD-10-CM

## 2018-04-12 DIAGNOSIS — R13.12 OROPHARYNGEAL DYSPHAGIA: ICD-10-CM

## 2018-04-13 ENCOUNTER — HOSPITAL ENCOUNTER (OUTPATIENT)
Dept: GENERAL RADIOLOGY | Age: 83
Discharge: HOME | End: 2018-04-13
Payer: COMMERCIAL

## 2018-04-13 DIAGNOSIS — R13.10 DYSPHAGIA, UNSPECIFIED TYPE: ICD-10-CM

## 2018-04-13 PROCEDURE — G8996 SWALLOW CURRENT STATUS: HCPCS | Performed by: SPEECH-LANGUAGE PATHOLOGIST

## 2018-04-13 PROCEDURE — 92611 MOTION FLUOROSCOPY/SWALLOW: CPT | Performed by: SPEECH-LANGUAGE PATHOLOGIST

## 2018-04-13 PROCEDURE — G8997 SWALLOW GOAL STATUS: HCPCS | Performed by: SPEECH-LANGUAGE PATHOLOGIST

## 2018-04-13 PROCEDURE — G8998 SWALLOW D/C STATUS: HCPCS | Performed by: SPEECH-LANGUAGE PATHOLOGIST

## 2018-04-13 PROCEDURE — 74230 X-RAY XM SWLNG FUNCJ C+: CPT

## 2018-04-13 PROCEDURE — 2500000003 HC RX 250 WO HCPCS: Performed by: FAMILY MEDICINE

## 2018-04-13 RX ADMIN — BARIUM SULFATE 20 ML: 400 PASTE ORAL at 14:17

## 2018-04-13 RX ADMIN — BARIUM SULFATE 20 ML: 0.81 POWDER, FOR SUSPENSION ORAL at 14:18

## 2018-04-13 RX ADMIN — BARIUM SULFATE 40 ML: 400 SUSPENSION ORAL at 14:18

## 2024-04-26 NOTE — PROGRESS NOTES
[facility]  OCCUPATIONAL THERAPY MISSED TREATMENT NOTE  STRZ RENAL TELEMETRY 6K  [id number]      Date: 3/9/2018  Patient Name: Harvey Keene        CSN: [id number]   : 1935  ([de-identified] y.o.)  Gender: female   Referring Practitioner: Kiersten Love MD  Diagnosis: Altered Mental Status         REASON FOR MISSED TREATMENT:  Missed Treat. PAST SURGICAL HISTORY:  H/O hernia repair